# Patient Record
Sex: MALE | Race: WHITE | NOT HISPANIC OR LATINO | URBAN - METROPOLITAN AREA
[De-identification: names, ages, dates, MRNs, and addresses within clinical notes are randomized per-mention and may not be internally consistent; named-entity substitution may affect disease eponyms.]

---

## 2022-06-19 ENCOUNTER — EMERGENCY (EMERGENCY)
Facility: HOSPITAL | Age: 82
LOS: 1 days | Discharge: HOME | End: 2022-06-19
Attending: EMERGENCY MEDICINE | Admitting: EMERGENCY MEDICINE
Payer: MEDICARE

## 2022-06-19 VITALS
RESPIRATION RATE: 17 BRPM | WEIGHT: 145.06 LBS | OXYGEN SATURATION: 98 % | SYSTOLIC BLOOD PRESSURE: 143 MMHG | DIASTOLIC BLOOD PRESSURE: 72 MMHG | HEIGHT: 70 IN | HEART RATE: 93 BPM | TEMPERATURE: 100 F

## 2022-06-19 VITALS
TEMPERATURE: 98 F | HEART RATE: 80 BPM | OXYGEN SATURATION: 98 % | RESPIRATION RATE: 16 BRPM | DIASTOLIC BLOOD PRESSURE: 61 MMHG | SYSTOLIC BLOOD PRESSURE: 127 MMHG

## 2022-06-19 DIAGNOSIS — Z90.49 ACQUIRED ABSENCE OF OTHER SPECIFIED PARTS OF DIGESTIVE TRACT: Chronic | ICD-10-CM

## 2022-06-19 DIAGNOSIS — Z98.89 OTHER SPECIFIED POSTPROCEDURAL STATES: Chronic | ICD-10-CM

## 2022-06-19 LAB
ALBUMIN SERPL ELPH-MCNC: 3.2 G/DL — LOW (ref 3.3–5)
ALP SERPL-CCNC: 99 U/L — SIGNIFICANT CHANGE UP (ref 40–120)
ALT FLD-CCNC: 16 U/L — SIGNIFICANT CHANGE UP (ref 12–78)
ANION GAP SERPL CALC-SCNC: 5 MMOL/L — SIGNIFICANT CHANGE UP (ref 5–17)
AST SERPL-CCNC: 12 U/L — LOW (ref 15–37)
BASOPHILS # BLD AUTO: 0 K/UL — SIGNIFICANT CHANGE UP (ref 0–0.2)
BASOPHILS NFR BLD AUTO: 0 % — SIGNIFICANT CHANGE UP (ref 0–2)
BILIRUB SERPL-MCNC: 1.6 MG/DL — HIGH (ref 0.2–1.2)
BUN SERPL-MCNC: 28 MG/DL — HIGH (ref 7–23)
CALCIUM SERPL-MCNC: 9.7 MG/DL — SIGNIFICANT CHANGE UP (ref 8.5–10.1)
CHLORIDE SERPL-SCNC: 103 MMOL/L — SIGNIFICANT CHANGE UP (ref 96–108)
CO2 SERPL-SCNC: 27 MMOL/L — SIGNIFICANT CHANGE UP (ref 22–31)
CREAT SERPL-MCNC: 1.6 MG/DL — HIGH (ref 0.5–1.3)
EGFR: 43 ML/MIN/1.73M2 — LOW
EOSINOPHIL # BLD AUTO: 0.17 K/UL — SIGNIFICANT CHANGE UP (ref 0–0.5)
EOSINOPHIL NFR BLD AUTO: 1 % — SIGNIFICANT CHANGE UP (ref 0–6)
GLUCOSE SERPL-MCNC: 115 MG/DL — HIGH (ref 70–99)
HCT VFR BLD CALC: 46.8 % — SIGNIFICANT CHANGE UP (ref 39–50)
HGB BLD-MCNC: 16 G/DL — SIGNIFICANT CHANGE UP (ref 13–17)
LIDOCAIN IGE QN: 88 U/L — SIGNIFICANT CHANGE UP (ref 73–393)
LYMPHOCYTES # BLD AUTO: 0.17 K/UL — LOW (ref 1–3.3)
LYMPHOCYTES # BLD AUTO: 1 % — LOW (ref 13–44)
MCHC RBC-ENTMCNC: 30.1 PG — SIGNIFICANT CHANGE UP (ref 27–34)
MCHC RBC-ENTMCNC: 34.2 GM/DL — SIGNIFICANT CHANGE UP (ref 32–36)
MCV RBC AUTO: 88.1 FL — SIGNIFICANT CHANGE UP (ref 80–100)
MONOCYTES # BLD AUTO: 2.51 K/UL — HIGH (ref 0–0.9)
MONOCYTES NFR BLD AUTO: 15 % — HIGH (ref 2–14)
NEUTROPHILS # BLD AUTO: 13.72 K/UL — HIGH (ref 1.8–7.4)
NEUTROPHILS NFR BLD AUTO: 81 % — HIGH (ref 43–77)
NRBC # BLD: SIGNIFICANT CHANGE UP /100 WBCS (ref 0–0)
PLATELET # BLD AUTO: 203 K/UL — SIGNIFICANT CHANGE UP (ref 150–400)
POTASSIUM SERPL-MCNC: 4.6 MMOL/L — SIGNIFICANT CHANGE UP (ref 3.5–5.3)
POTASSIUM SERPL-SCNC: 4.6 MMOL/L — SIGNIFICANT CHANGE UP (ref 3.5–5.3)
PROT SERPL-MCNC: 7 G/DL — SIGNIFICANT CHANGE UP (ref 6–8.3)
RBC # BLD: 5.31 M/UL — SIGNIFICANT CHANGE UP (ref 4.2–5.8)
RBC # FLD: 12.3 % — SIGNIFICANT CHANGE UP (ref 10.3–14.5)
SODIUM SERPL-SCNC: 135 MMOL/L — SIGNIFICANT CHANGE UP (ref 135–145)
WBC # BLD: 16.73 K/UL — HIGH (ref 3.8–10.5)
WBC # FLD AUTO: 16.73 K/UL — HIGH (ref 3.8–10.5)

## 2022-06-19 PROCEDURE — 85025 COMPLETE CBC W/AUTO DIFF WBC: CPT

## 2022-06-19 PROCEDURE — 36415 COLL VENOUS BLD VENIPUNCTURE: CPT

## 2022-06-19 PROCEDURE — 83690 ASSAY OF LIPASE: CPT

## 2022-06-19 PROCEDURE — 74176 CT ABD & PELVIS W/O CONTRAST: CPT | Mod: 26,MA

## 2022-06-19 PROCEDURE — 99284 EMERGENCY DEPT VISIT MOD MDM: CPT | Mod: 25

## 2022-06-19 PROCEDURE — 74176 CT ABD & PELVIS W/O CONTRAST: CPT | Mod: MA

## 2022-06-19 PROCEDURE — 96374 THER/PROPH/DIAG INJ IV PUSH: CPT

## 2022-06-19 PROCEDURE — 80053 COMPREHEN METABOLIC PANEL: CPT

## 2022-06-19 PROCEDURE — 99284 EMERGENCY DEPT VISIT MOD MDM: CPT | Mod: FS

## 2022-06-19 RX ORDER — METRONIDAZOLE 500 MG
1 TABLET ORAL
Qty: 30 | Refills: 0
Start: 2022-06-19 | End: 2022-06-28

## 2022-06-19 RX ORDER — MULTIVIT WITH MIN/MFOLATE/K2 340-15/3 G
1 POWDER (GRAM) ORAL ONCE
Refills: 0 | Status: COMPLETED | OUTPATIENT
Start: 2022-06-19 | End: 2022-06-19

## 2022-06-19 RX ORDER — DIATRIZOATE MEGLUMINE 180 MG/ML
100 INJECTION, SOLUTION INTRAVESICAL ONCE
Refills: 0 | Status: COMPLETED | OUTPATIENT
Start: 2022-06-19 | End: 2022-06-19

## 2022-06-19 RX ORDER — SODIUM CHLORIDE 9 MG/ML
1000 INJECTION INTRAMUSCULAR; INTRAVENOUS; SUBCUTANEOUS ONCE
Refills: 0 | Status: COMPLETED | OUTPATIENT
Start: 2022-06-19 | End: 2022-06-19

## 2022-06-19 RX ORDER — PIPERACILLIN AND TAZOBACTAM 4; .5 G/20ML; G/20ML
3.38 INJECTION, POWDER, LYOPHILIZED, FOR SOLUTION INTRAVENOUS ONCE
Refills: 0 | Status: COMPLETED | OUTPATIENT
Start: 2022-06-19 | End: 2022-06-19

## 2022-06-19 RX ORDER — IOHEXOL 300 MG/ML
30 INJECTION, SOLUTION INTRAVENOUS ONCE
Refills: 0 | Status: DISCONTINUED | OUTPATIENT
Start: 2022-06-19 | End: 2022-06-19

## 2022-06-19 RX ADMIN — PIPERACILLIN AND TAZOBACTAM 200 GRAM(S): 4; .5 INJECTION, POWDER, LYOPHILIZED, FOR SOLUTION INTRAVENOUS at 21:53

## 2022-06-19 RX ADMIN — DIATRIZOATE MEGLUMINE 30 MILLILITER(S): 180 INJECTION, SOLUTION INTRAVESICAL at 17:52

## 2022-06-19 RX ADMIN — Medication 1 BOTTLE: at 21:53

## 2022-06-19 RX ADMIN — SODIUM CHLORIDE 1000 MILLILITER(S): 9 INJECTION INTRAMUSCULAR; INTRAVENOUS; SUBCUTANEOUS at 14:44

## 2022-06-19 NOTE — ED ADULT NURSE REASSESSMENT NOTE - NS ED NURSE REASSESS COMMENT FT1
1405: Pt voided 100 cc of lawrence urine, post bladder scan done urine: > 113. DARI mack. # 16 fr Knight Cath inserted with the proper technique.

## 2022-06-19 NOTE — ED PROVIDER NOTE - OBJECTIVE STATEMENT
82 M c/o constipation and lower abd pain x 6 days. Spoke to covering GI yesterday who recommended senna, took yesterday with no improvement. Started keflex 6 days ago due to mohs surgery on forehead. Due for colonoscopy at end of this month due to GI issues, diarrhea. Tolerating PO.    GI Ashley  PMD [place]

## 2022-06-19 NOTE — ED PROVIDER NOTE - PROGRESS NOTE DETAILS
Reevaluated patient at bedside.  Patient feeling well. Smog not given. Discussed the results of all diagnostic testing in ED and copies of all available reports given.   An opportunity to ask questions was provided.  Discussed the importance of prompt, close medical follow-up. Advised to call his GI doctor tomorrow to discuss results and follow up. Patient will return with any changes, concerns or persistent/worsening symptoms.  Understanding of all instructions verbalized.

## 2022-06-19 NOTE — ED PROVIDER NOTE - ATTENDING APP SHARED VISIT CONTRIBUTION OF CARE
pt seen and examined with wife at bedside he is 81 yo hx of bphn memory impairment, tremor pmd dr lynch gi dr herring sp appy and hernia surgery presents with abd pain and distention for past few days no bm for 6 days. 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

## 2022-06-19 NOTE — ED PROVIDER NOTE - NSFOLLOWUPINSTRUCTIONS_ED_ALL_ED_FT
Augmentin twice a day.  Flagyl three times a day.  Call your GI doctor tomorrow to discuss results.  Return to the Emergency Department for worsening or concerning symptoms.    Diverticulitis is when small pouches in your colon (large intestine) get infected or swollen. This causes pain in the belly (abdomen) and watery poop (diarrhea).    These pouches are called diverticula. The pouches form in people who have a condition called diverticulosis.      What are the causes?    This condition may be caused by poop (stool) that gets trapped in the pouches in your colon. The poop lets germs (bacteria) grow in the pouches. This causes the infection.      What increases the risk?    You are more likely to get this condition if you have small pouches in your colon. The risk is higher if:  •You are overweight or very overweight (obese).      •You do not exercise enough.      •You drink alcohol.      •You smoke or use products with tobacco in them.      •You eat a diet that has a lot of red meat such as beef, pork, or lamb.      •You eat a diet that does not have enough fiber in it.      •You are older than 40 years of age.        What are the signs or symptoms?    •Pain in the belly. Pain is often on the left side, but it may be in other areas.      •Fever and feeling cold.      •Feeling like you may vomit.      •Vomiting.      •Having cramps.      •Feeling full.      •Changes to how often you poop.      •Blood in your poop.        How is this treated?    Most cases are treated at home by:  •Taking over-the-counter pain medicines.      •Following a clear liquid diet.      •Taking antibiotic medicines.      •Resting.      Very bad cases may need to be treated at a hospital. This may include:  •Not eating or drinking.      •Taking prescription pain medicine.      •Getting antibiotic medicines through an IV tube.      •Getting fluid and food through an IV tube.      •Having surgery.      When you are feeling better, your doctor may tell you to have a test to check your colon (colonoscopy).      Follow these instructions at home:    Medicines   •Take over-the-counter and prescription medicines only as told by your doctor. These include:  •Antibiotics.      •Pain medicines.      •Fiber pills.      •Probiotics.      •Stool softeners.        •If you were prescribed an antibiotic medicine, take it as told by your doctor. Do not stop taking the antibiotic even if you start to feel better.      •Ask your doctor if the medicine prescribed to you requires you to avoid driving or using machinery.        Eating and drinking      •Follow a diet as told by your doctor.    •When you feel better, your doctor may tell you to change your diet. You may need to eat a lot of fiber. Fiber makes it easier to poop (have a bowel movement). Foods with fiber include:  •Berries.      •Beans.      •Lentils.      •Green vegetables.        •Avoid eating red meat.      General instructions     • Do not use any products that contain nicotine or tobacco, such as cigarettes, e-cigarettes, and chewing tobacco. If you need help quitting, ask your doctor.      •Exercise 3 or more times a week. Try to get 30 minutes each time. Exercise enough to sweat and make your heart beat faster.      •Keep all follow-up visits as told by your doctor. This is important.        Contact a doctor if:    •Your pain does not get better.      •You are not pooping like normal.        Get help right away if:    •Your pain gets worse.      •Your symptoms do not get better.      •Your symptoms get worse very fast.      •You have a fever.      •You vomit more than one time.    •You have poop that is:  •Bloody.      •Black.      •Tarry.          Summary    •This condition happens when small pouches in your colon get infected or swollen.      •Take medicines only as told by your doctor.      •Follow a diet as told by your doctor.      •Keep all follow-up visits as told by your doctor. This is important.      This information is not intended to replace advice given to you by your health care provider. Make sure you discuss any questions you have with your health care provider.

## 2022-06-19 NOTE — ED PROVIDER NOTE - CLINICAL SUMMARY MEDICAL DECISION MAKING FREE TEXT BOX
pt seen and examined with wife at bedside he is 83 yo hx of bphn memory impairment, tremor pmd dr lynch gi dr herring sp appy and hernia surgery presents with abd pain and distention for past few days no bm for 6 days. 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

## 2022-06-19 NOTE — ED ADULT TRIAGE NOTE - CHIEF COMPLAINT QUOTE
"I havent had a bowel movement in 6 days and I have abd pains" "I haven't had a bowel movement in 6 days and I have abd pains"

## 2022-06-19 NOTE — ED PROVIDER NOTE - CARE PROVIDER_API CALL
All Ramirez)  Gastroenterology; Internal Medicine  [address], [address]  [place] [address]  Phone: [phone]  Fax: [phone]  Follow Up Time:

## 2022-06-19 NOTE — ED PROVIDER NOTE - PATIENT PORTAL LINK FT
You can access the FollowMyHealth Patient Portal offered by [facility] by registering at the following website: [email or web address]. By joining [facility]’s FollowMyHealth portal, you will also be able to view your health information using other applications (apps) compatible with our system.

## 2022-06-19 NOTE — ED ADULT NURSE NOTE - NSICDXPASTMEDICALHX_GEN_ALL_CORE_FT
PAST MEDICAL HISTORY:  Benign non-nodular prostatic hyperplasia, presence of lower urinary tract symptoms unspecified     Hypothyroid     Memory impairment

## 2022-06-19 NOTE — ED PROVIDER NOTE - NS ED ATTENDING STATEMENT MOD
This was a shared visit with the NADINE. I reviewed and verified the documentation and independently performed the documented:

## 2022-11-22 ENCOUNTER — TRANSCRIPTION ENCOUNTER (OUTPATIENT)
Age: 82
End: 2022-11-22

## 2022-11-23 ENCOUNTER — INPATIENT (INPATIENT)
Facility: HOSPITAL | Age: 82
LOS: 7 days | Discharge: HOME | DRG: 329 | End: 2022-12-01
Attending: SPECIALIST | Admitting: SPECIALIST
Payer: MEDICARE

## 2022-11-23 ENCOUNTER — TRANSCRIPTION ENCOUNTER (OUTPATIENT)
Age: 82
End: 2022-11-23

## 2022-11-23 ENCOUNTER — RESULT REVIEW (OUTPATIENT)
Age: 82
End: 2022-11-23

## 2022-11-23 VITALS
OXYGEN SATURATION: 98 % | HEART RATE: 84 BPM | RESPIRATION RATE: 18 BRPM | SYSTOLIC BLOOD PRESSURE: 153 MMHG | DIASTOLIC BLOOD PRESSURE: 80 MMHG | TEMPERATURE: 97 F | WEIGHT: 147.93 LBS

## 2022-11-23 DIAGNOSIS — Z98.890 OTHER SPECIFIED POSTPROCEDURAL STATES: Chronic | ICD-10-CM

## 2022-11-23 DIAGNOSIS — E03.9 HYPOTHYROIDISM, UNSPECIFIED: ICD-10-CM

## 2022-11-23 DIAGNOSIS — I49.9 CARDIAC ARRHYTHMIA, UNSPECIFIED: ICD-10-CM

## 2022-11-23 DIAGNOSIS — Z98.89 OTHER SPECIFIED POSTPROCEDURAL STATES: Chronic | ICD-10-CM

## 2022-11-23 DIAGNOSIS — Z87.438 PERSONAL HISTORY OF OTHER DISEASES OF MALE GENITAL ORGANS: ICD-10-CM

## 2022-11-23 DIAGNOSIS — K63.1 PERFORATION OF INTESTINE (NONTRAUMATIC): ICD-10-CM

## 2022-11-23 DIAGNOSIS — Z90.49 ACQUIRED ABSENCE OF OTHER SPECIFIED PARTS OF DIGESTIVE TRACT: Chronic | ICD-10-CM

## 2022-11-23 PROBLEM — R41.3 OTHER AMNESIA: Chronic | Status: ACTIVE | Noted: 2022-06-19

## 2022-11-23 LAB
ALBUMIN SERPL ELPH-MCNC: 3.3 G/DL — SIGNIFICANT CHANGE UP (ref 3.3–5)
ALP SERPL-CCNC: 94 U/L — SIGNIFICANT CHANGE UP (ref 40–120)
ALT FLD-CCNC: 20 U/L — SIGNIFICANT CHANGE UP (ref 12–78)
ANION GAP SERPL CALC-SCNC: 6 MMOL/L — SIGNIFICANT CHANGE UP (ref 5–17)
APTT BLD: 31.1 SEC — SIGNIFICANT CHANGE UP (ref 27.5–35.5)
AST SERPL-CCNC: 14 U/L — LOW (ref 15–37)
BASOPHILS # BLD AUTO: 0.02 K/UL — SIGNIFICANT CHANGE UP (ref 0–0.2)
BASOPHILS NFR BLD AUTO: 0.1 % — SIGNIFICANT CHANGE UP (ref 0–2)
BILIRUB SERPL-MCNC: 2 MG/DL — HIGH (ref 0.2–1.2)
BUN SERPL-MCNC: 22 MG/DL — SIGNIFICANT CHANGE UP (ref 7–23)
CALCIUM SERPL-MCNC: 9.5 MG/DL — SIGNIFICANT CHANGE UP (ref 8.5–10.1)
CHLORIDE SERPL-SCNC: 105 MMOL/L — SIGNIFICANT CHANGE UP (ref 96–108)
CO2 SERPL-SCNC: 27 MMOL/L — SIGNIFICANT CHANGE UP (ref 22–31)
CREAT SERPL-MCNC: 1.4 MG/DL — HIGH (ref 0.5–1.3)
EGFR: 50 ML/MIN/1.73M2 — LOW
EOSINOPHIL # BLD AUTO: 0.03 K/UL — SIGNIFICANT CHANGE UP (ref 0–0.5)
EOSINOPHIL NFR BLD AUTO: 0.2 % — SIGNIFICANT CHANGE UP (ref 0–6)
GLUCOSE SERPL-MCNC: 114 MG/DL — HIGH (ref 70–99)
HCT VFR BLD CALC: 45.2 % — SIGNIFICANT CHANGE UP (ref 39–50)
HGB BLD-MCNC: 15.2 G/DL — SIGNIFICANT CHANGE UP (ref 13–17)
IMM GRANULOCYTES NFR BLD AUTO: 0.3 % — SIGNIFICANT CHANGE UP (ref 0–0.9)
INR BLD: 1.41 RATIO — HIGH (ref 0.88–1.16)
LACTATE SERPL-SCNC: 0.6 MMOL/L — LOW (ref 0.7–2)
LIDOCAIN IGE QN: 85 U/L — SIGNIFICANT CHANGE UP (ref 73–393)
LYMPHOCYTES # BLD AUTO: 0.64 K/UL — LOW (ref 1–3.3)
LYMPHOCYTES # BLD AUTO: 4.4 % — LOW (ref 13–44)
MAGNESIUM SERPL-MCNC: 2 MG/DL — SIGNIFICANT CHANGE UP (ref 1.6–2.6)
MCHC RBC-ENTMCNC: 30 PG — SIGNIFICANT CHANGE UP (ref 27–34)
MCHC RBC-ENTMCNC: 33.6 GM/DL — SIGNIFICANT CHANGE UP (ref 32–36)
MCV RBC AUTO: 89.3 FL — SIGNIFICANT CHANGE UP (ref 80–100)
MONOCYTES # BLD AUTO: 1 K/UL — HIGH (ref 0–0.9)
MONOCYTES NFR BLD AUTO: 6.8 % — SIGNIFICANT CHANGE UP (ref 2–14)
NEUTROPHILS # BLD AUTO: 12.88 K/UL — HIGH (ref 1.8–7.4)
NEUTROPHILS NFR BLD AUTO: 88.2 % — HIGH (ref 43–77)
NRBC # BLD: 0 /100 WBCS — SIGNIFICANT CHANGE UP (ref 0–0)
PLATELET # BLD AUTO: 175 K/UL — SIGNIFICANT CHANGE UP (ref 150–400)
POTASSIUM SERPL-MCNC: 4.3 MMOL/L — SIGNIFICANT CHANGE UP (ref 3.5–5.3)
POTASSIUM SERPL-SCNC: 4.3 MMOL/L — SIGNIFICANT CHANGE UP (ref 3.5–5.3)
PROT SERPL-MCNC: 7 G/DL — SIGNIFICANT CHANGE UP (ref 6–8.3)
PROTHROM AB SERPL-ACNC: 16.6 SEC — HIGH (ref 10.5–13.4)
RBC # BLD: 5.06 M/UL — SIGNIFICANT CHANGE UP (ref 4.2–5.8)
RBC # FLD: 12.9 % — SIGNIFICANT CHANGE UP (ref 10.3–14.5)
SARS-COV-2 RNA SPEC QL NAA+PROBE: SIGNIFICANT CHANGE UP
SODIUM SERPL-SCNC: 138 MMOL/L — SIGNIFICANT CHANGE UP (ref 135–145)
TROPONIN I, HIGH SENSITIVITY RESULT: 7.1 NG/L — SIGNIFICANT CHANGE UP
TSH SERPL-MCNC: 3.21 UIU/ML — SIGNIFICANT CHANGE UP (ref 0.36–3.74)
WBC # BLD: 14.61 K/UL — HIGH (ref 3.8–10.5)
WBC # FLD AUTO: 14.61 K/UL — HIGH (ref 3.8–10.5)

## 2022-11-23 PROCEDURE — G1004: CPT

## 2022-11-23 PROCEDURE — 93010 ELECTROCARDIOGRAM REPORT: CPT

## 2022-11-23 PROCEDURE — 99222 1ST HOSP IP/OBS MODERATE 55: CPT

## 2022-11-23 PROCEDURE — 74177 CT ABD & PELVIS W/CONTRAST: CPT | Mod: 26,ME

## 2022-11-23 PROCEDURE — 44202 LAP ENTERECTOMY: CPT

## 2022-11-23 PROCEDURE — 99285 EMERGENCY DEPT VISIT HI MDM: CPT | Mod: FS

## 2022-11-23 PROCEDURE — 44202 LAP ENTERECTOMY: CPT | Mod: 80

## 2022-11-23 PROCEDURE — 88307 TISSUE EXAM BY PATHOLOGIST: CPT | Mod: 26

## 2022-11-23 PROCEDURE — 99223 1ST HOSP IP/OBS HIGH 75: CPT

## 2022-11-23 DEVICE — STAPLER COVIDIEN TA 90 BLUE: Type: IMPLANTABLE DEVICE | Status: FUNCTIONAL

## 2022-11-23 DEVICE — STAPLER COVIDIEN TRI-STAPLE 60MM TAN RELOAD: Type: IMPLANTABLE DEVICE | Status: FUNCTIONAL

## 2022-11-23 RX ORDER — SODIUM CHLORIDE 9 MG/ML
1000 INJECTION INTRAMUSCULAR; INTRAVENOUS; SUBCUTANEOUS ONCE
Refills: 0 | Status: COMPLETED | OUTPATIENT
Start: 2022-11-23 | End: 2022-11-23

## 2022-11-23 RX ORDER — PIPERACILLIN AND TAZOBACTAM 4; .5 G/20ML; G/20ML
3.38 INJECTION, POWDER, LYOPHILIZED, FOR SOLUTION INTRAVENOUS ONCE
Refills: 0 | Status: COMPLETED | OUTPATIENT
Start: 2022-11-23 | End: 2022-11-23

## 2022-11-23 RX ORDER — MORPHINE SULFATE 50 MG/1
4 CAPSULE, EXTENDED RELEASE ORAL ONCE
Refills: 0 | Status: DISCONTINUED | OUTPATIENT
Start: 2022-11-23 | End: 2022-11-23

## 2022-11-23 RX ORDER — SODIUM CHLORIDE 9 MG/ML
1000 INJECTION, SOLUTION INTRAVENOUS
Refills: 0 | Status: DISCONTINUED | OUTPATIENT
Start: 2022-11-23 | End: 2022-11-26

## 2022-11-23 RX ORDER — ACETAMINOPHEN 500 MG
1000 TABLET ORAL ONCE
Refills: 0 | Status: COMPLETED | OUTPATIENT
Start: 2022-11-24 | End: 2022-11-24

## 2022-11-23 RX ORDER — SODIUM CHLORIDE 9 MG/ML
1000 INJECTION INTRAMUSCULAR; INTRAVENOUS; SUBCUTANEOUS
Refills: 0 | Status: DISCONTINUED | OUTPATIENT
Start: 2022-11-23 | End: 2022-11-23

## 2022-11-23 RX ORDER — HYDROMORPHONE HYDROCHLORIDE 2 MG/ML
0.25 INJECTION INTRAMUSCULAR; INTRAVENOUS; SUBCUTANEOUS EVERY 4 HOURS
Refills: 0 | Status: DISCONTINUED | OUTPATIENT
Start: 2022-11-23 | End: 2022-11-27

## 2022-11-23 RX ORDER — TAMSULOSIN HYDROCHLORIDE 0.4 MG/1
0.4 CAPSULE ORAL AT BEDTIME
Refills: 0 | Status: DISCONTINUED | OUTPATIENT
Start: 2022-11-23 | End: 2022-12-01

## 2022-11-23 RX ORDER — ENOXAPARIN SODIUM 100 MG/ML
40 INJECTION SUBCUTANEOUS EVERY 24 HOURS
Refills: 0 | Status: DISCONTINUED | OUTPATIENT
Start: 2022-11-24 | End: 2022-12-01

## 2022-11-23 RX ORDER — CYCLOSPORINE 100 MG/1
100 CAPSULE ORAL DAILY
Refills: 0 | Status: DISCONTINUED | OUTPATIENT
Start: 2022-11-23 | End: 2022-11-24

## 2022-11-23 RX ORDER — ONDANSETRON 8 MG/1
4 TABLET, FILM COATED ORAL ONCE
Refills: 0 | Status: COMPLETED | OUTPATIENT
Start: 2022-11-23 | End: 2022-11-23

## 2022-11-23 RX ORDER — HYDROMORPHONE HYDROCHLORIDE 2 MG/ML
0.5 INJECTION INTRAMUSCULAR; INTRAVENOUS; SUBCUTANEOUS
Refills: 0 | Status: DISCONTINUED | OUTPATIENT
Start: 2022-11-23 | End: 2022-11-24

## 2022-11-23 RX ORDER — ACETAMINOPHEN 500 MG
1000 TABLET ORAL ONCE
Refills: 0 | Status: COMPLETED | OUTPATIENT
Start: 2022-11-23 | End: 2022-11-23

## 2022-11-23 RX ORDER — KETOROLAC TROMETHAMINE 30 MG/ML
15 SYRINGE (ML) INJECTION EVERY 6 HOURS
Refills: 0 | Status: DISCONTINUED | OUTPATIENT
Start: 2022-11-23 | End: 2022-11-23

## 2022-11-23 RX ORDER — PANTOPRAZOLE SODIUM 20 MG/1
40 TABLET, DELAYED RELEASE ORAL DAILY
Refills: 0 | Status: DISCONTINUED | OUTPATIENT
Start: 2022-11-23 | End: 2022-11-28

## 2022-11-23 RX ORDER — LEVOTHYROXINE SODIUM 125 MCG
50 TABLET ORAL AT BEDTIME
Refills: 0 | Status: DISCONTINUED | OUTPATIENT
Start: 2022-11-23 | End: 2022-11-30

## 2022-11-23 RX ORDER — ACETAMINOPHEN 500 MG
1000 TABLET ORAL ONCE
Refills: 0 | Status: COMPLETED | OUTPATIENT
Start: 2022-11-25 | End: 2022-11-25

## 2022-11-23 RX ORDER — PIPERACILLIN AND TAZOBACTAM 4; .5 G/20ML; G/20ML
3.38 INJECTION, POWDER, LYOPHILIZED, FOR SOLUTION INTRAVENOUS EVERY 8 HOURS
Refills: 0 | Status: COMPLETED | OUTPATIENT
Start: 2022-11-23 | End: 2022-11-30

## 2022-11-23 RX ORDER — METOPROLOL TARTRATE 50 MG
1.25 TABLET ORAL EVERY 6 HOURS
Refills: 0 | Status: DISCONTINUED | OUTPATIENT
Start: 2022-11-23 | End: 2022-11-26

## 2022-11-23 RX ORDER — SODIUM CHLORIDE 9 MG/ML
1000 INJECTION, SOLUTION INTRAVENOUS
Refills: 0 | Status: DISCONTINUED | OUTPATIENT
Start: 2022-11-23 | End: 2022-11-24

## 2022-11-23 RX ORDER — HYDROMORPHONE HYDROCHLORIDE 2 MG/ML
0.5 INJECTION INTRAMUSCULAR; INTRAVENOUS; SUBCUTANEOUS EVERY 4 HOURS
Refills: 0 | Status: DISCONTINUED | OUTPATIENT
Start: 2022-11-23 | End: 2022-11-27

## 2022-11-23 RX ORDER — DONEPEZIL HYDROCHLORIDE 10 MG/1
5 TABLET, FILM COATED ORAL AT BEDTIME
Refills: 0 | Status: DISCONTINUED | OUTPATIENT
Start: 2022-11-23 | End: 2022-12-01

## 2022-11-23 RX ORDER — ONDANSETRON 8 MG/1
4 TABLET, FILM COATED ORAL EVERY 6 HOURS
Refills: 0 | Status: DISCONTINUED | OUTPATIENT
Start: 2022-11-23 | End: 2022-12-01

## 2022-11-23 RX ORDER — ONDANSETRON 8 MG/1
4 TABLET, FILM COATED ORAL ONCE
Refills: 0 | Status: DISCONTINUED | OUTPATIENT
Start: 2022-11-23 | End: 2022-11-24

## 2022-11-23 RX ADMIN — PIPERACILLIN AND TAZOBACTAM 25 GRAM(S): 4; .5 INJECTION, POWDER, LYOPHILIZED, FOR SOLUTION INTRAVENOUS at 23:10

## 2022-11-23 RX ADMIN — SODIUM CHLORIDE 1000 MILLILITER(S): 9 INJECTION INTRAMUSCULAR; INTRAVENOUS; SUBCUTANEOUS at 13:04

## 2022-11-23 RX ADMIN — HYDROMORPHONE HYDROCHLORIDE 0.5 MILLIGRAM(S): 2 INJECTION INTRAMUSCULAR; INTRAVENOUS; SUBCUTANEOUS at 22:38

## 2022-11-23 RX ADMIN — Medication 400 MILLIGRAM(S): at 22:50

## 2022-11-23 RX ADMIN — HYDROMORPHONE HYDROCHLORIDE 0.5 MILLIGRAM(S): 2 INJECTION INTRAMUSCULAR; INTRAVENOUS; SUBCUTANEOUS at 22:16

## 2022-11-23 RX ADMIN — MORPHINE SULFATE 4 MILLIGRAM(S): 50 CAPSULE, EXTENDED RELEASE ORAL at 12:34

## 2022-11-23 RX ADMIN — SODIUM CHLORIDE 1000 MILLILITER(S): 9 INJECTION INTRAMUSCULAR; INTRAVENOUS; SUBCUTANEOUS at 12:04

## 2022-11-23 RX ADMIN — PIPERACILLIN AND TAZOBACTAM 200 GRAM(S): 4; .5 INJECTION, POWDER, LYOPHILIZED, FOR SOLUTION INTRAVENOUS at 16:04

## 2022-11-23 RX ADMIN — SODIUM CHLORIDE 100 MILLILITER(S): 9 INJECTION INTRAMUSCULAR; INTRAVENOUS; SUBCUTANEOUS at 18:24

## 2022-11-23 RX ADMIN — PIPERACILLIN AND TAZOBACTAM 3.38 GRAM(S): 4; .5 INJECTION, POWDER, LYOPHILIZED, FOR SOLUTION INTRAVENOUS at 16:34

## 2022-11-23 RX ADMIN — ONDANSETRON 4 MILLIGRAM(S): 8 TABLET, FILM COATED ORAL at 12:04

## 2022-11-23 RX ADMIN — MORPHINE SULFATE 4 MILLIGRAM(S): 50 CAPSULE, EXTENDED RELEASE ORAL at 12:04

## 2022-11-23 RX ADMIN — SODIUM CHLORIDE 100 MILLILITER(S): 9 INJECTION, SOLUTION INTRAVENOUS at 22:10

## 2022-11-23 NOTE — ED ADULT NURSE NOTE - OBJECTIVE STATEMENT
Pt sent from urgent care due to abd distention/pain, no BM for the past few days. Seen for similar June 2022, diagnosed with diverticulitis. Also states has been having intermittent palpitations x days vs weeks. Has loop recorder but states it is "not hooked up". Denies f/c, cp, sob, n/v, dysuria, wife at bedside. Safety maintained, call bell within reach. Nursing care ongoing.

## 2022-11-23 NOTE — ED PROVIDER NOTE - CLINICAL SUMMARY MEDICAL DECISION MAKING FREE TEXT BOX
Patient is an 82-year-old male who presents the emergency room with a chief complaint of abdominal pain.  Past medical history of BPH, hypothyroidism, memory impairment, diverticulitis.  Patient was seen this past June with abdominal pain and constipation.  He was diagnosed with acute uncomplicated diverticulitis at the time and was discharged home.  He was post to follow-up with GI for colonoscopy but did not.  He reports that those symptoms have resolved and he was doing well up until approximately 3 days ago when he developed diffuse lower abdominal pain and bloating.  He denies any fevers chills nausea vomiting chest pain shortness of breath.  He reports that he has been constipated for the last several days.  He feels similar to the time he was diagnosed with diverticulitis.  He also reports that he has had some intermittent palpitations for the last few weeks he has a loop recorder but is not "hooked up".  He was initially seen at urgent care and was sent to the emergency room for further work-up.  On exam patient is laying in bed no acute distress normocephalic atraumatic pupils are equal round and reactive heart is regular rate lungs are clear to auscultation abdomen is soft but distended with diffuse tenderness to palpation to the lower abdominal region, hypoactive bowel sounds noted.  Patient presenting to the emergency room with a chief complaint of abdominal pain constipation.  Prior history of diverticulitis and high suspicion for recurrent episode.  Will obtain screening labs CT abdomen pelvis.  Patient is also reporting some intermittent palpitations well to do thyroid level EKG and will monitor.  This time patient appears to be stable and can likely be discharged home but his ultimate clinical course will be pending results.

## 2022-11-23 NOTE — PRE-OP CHECKLIST - NS PREOP CHK MONITOR ANESTHESIA CONSENT
done

## 2022-11-23 NOTE — H&P ADULT - NSHPPHYSICALEXAM_GEN_ALL_CORE
T(C): 36.3 (11-23-22 @ 11:01), Max: 36.3 (11-23-22 @ 11:01)  HR: 84 (11-23-22 @ 11:01) (84 - 84)  BP: 153/80 (11-23-22 @ 11:01) (153/80 - 153/80)  RR: 18 (11-23-22 @ 11:01) (18 - 18)  SpO2: 98% (11-23-22 @ 11:01) (98% - 98%)    CONSTITUTIONAL: Well groomed, no apparent distress  EYES: EOMI, No conjunctival or scleral injection, non-icteric  ENMT: Oral mucosa with moist membranes. Normal dentition  NECK: Supple, symmetric and without tracheal deviation   RESP: No respiratory distress, no use of accessory muscles  CV: b/l radial pulses strong  GI: distended, tender in all quadrants with increased tenderness to RLQ, voluntary guarding, no rebound; no hernia palpated. Appendectomy scar in RLQ open. Open R inguinal hernia scar.  PSYCH: Appropriate insight/judgment; mood and affect appropriate, recent/remote memory intact  Neuro: grossly intact

## 2022-11-23 NOTE — H&P ADULT - NSHPLABSRESULTS_GEN_ALL_CORE
Complete Blood Count + Automated Diff (11.23.22 @ 12:00)   WBC Count: 14.61 K/uL   RBC Count: 5.06 M/uL   Hemoglobin: 15.2 g/dL   Hematocrit: 45.2 %   Mean Cell Volume: 89.3 fl   Mean Cell Hemoglobin: 30.0 pg   Mean Cell Hemoglobin Conc: 33.6 gm/dL Troponin I, High Sensitivity Result: 7.1: Comprehensive Metabolic Panel (11.23.22 @ 12:00)   Sodium, Serum: 138 mmol/L   Potassium, Serum: 4.3 mmol/L   Chloride, Serum: 105 mmol/L   Carbon Dioxide, Serum: 27 mmol/L   Anion Gap, Serum: 6 mmol/L   Blood Urea Nitrogen, Serum: 22 mg/dL   Creatinine, Serum: 1.40 mg/dL   Glucose, Serum: 114 mg/dL   Calcium, Total Serum: 9.5 mg/dL   Protein Total, Serum: 7.0 g/dL   Albumin, Serum: 3.3 g/dL   Bilirubin Total, Serum: 2.0 mg/dL   Alkaline Phosphatase, Serum: 94 U/L   Aspartate Aminotransferase (AST/SGOT): 14 U/L   Alanine Aminotransferase (ALT/SGPT): 20 U/L   eGFR: 50: The estimated glomerular filtration rate (eGFR) is calculated using the < from: CT Abdomen and Pelvis w/ IV Cont (11.23.22 @ 14:32) >    CT of the Abdomen and Pelvis was performed.  Sagittal and coronal reformats were performed.    FINDINGS:    LOWER CHEST: No visualized pleural effusion. Dependent lung parenchymal   scarring. Coronary artery calcifications.    LIVER: Liver size within normal limits.  BILE DUCTS: Nobiliary distention  GALLBLADDER: Unremarkable  SPLEEN: Spleen size within normal limits  PANCREAS: No acute peripancreatic inflammation  ADRENALS: Unremarkable  KIDNEYS/URETERS: No hydronephrosis. Few subcentimeter hypodensities of   the kidneys are too small to characterize. Prominent left-sided   extrarenal pelvis are demonstrated. 2 mm nonobstructing right renal   calculus.    BLADDER: Mildly distended.  REPRODUCTIVE ORGANS: Enlarged prostate.    BOWEL: Stomach is moderately distended. Proximal to mid small bowel is   nondistended. Distal small bowel loops in the right lower quadrant   demonstrates short segment severe focal wall thickening and surrounding   extraluminal air-fluid measuring 2 cm along the mesentery. Additional   tiny droplets of extraluminal air noted. Findings are concerning for   focal small bowel enteritis with perforation. Few small bowel diverticula   are noted, as seen on prior imaging. Postappendectomy. Mild stool burden   of the colon limits evaluation of the colonic mucosa. Slight thickening   of the right colon is likely reactive in nature.  PERITONEUM: Right-sided mesenteric edema. 2 cm focal air/fluid of the   right hemiabdomen image 77 series 2. Trace additional ascites.  VESSELS: Aortoiliac atheromatous changes. Nonspecific slight soft tissue   prominence at the celiac trunk origin, of unclear significance. No   abdominal aortic aneurysm.  RETROPERITONEUM/LYMPH NODES: Small volume nodes.  ABDOMINAL WALL: Left inguinal postoperative changes.  BONES: Degenerative changes of the bones. Central canal and neural   foramina are not adequately assessed on this study.    IMPRESSION:    Severely inflamed short segment small bowel with perforation. 2cm   air/fluid pocket adjacent to the inflamed small bowel loop. Etiology is   unclear. Follow to resolution. Few small bowel diverticula are noted, as   seen on prior imaging.    Dr. Aparicio discussed these findings with DARI Thao on 11/23/2022 at   3:46PM, with read back.    Coronary artery calcifications.    < end of copied text >

## 2022-11-23 NOTE — CONSULT NOTE ADULT - ASSESSMENT
Assessment/Plan:  This is an 83 y/o M with BPH, hypothyroidism with recent ILR for possible arrhythmia presented from an Urgent Care Ctr for abdominal distention, diffuse bloating, and pain.  States, has not had BM for few days.   Had same symptoms in June of this year and diagnosed with diverticulitis.  Also c/p palpitation x weeks.  Denies CP, lightheadedness, SOB, GRIMALDO or orthopnea.  Denies N/V, fever, chills.    Cardiac Optimization/Cardiac Arrhythmia  - s/p recent (2 weeks ago) ILR for palpitations.  EKG showed sinus arrhythmias with no ischemia  - Patient is here for SBO with perforation  - Patient is now planned for surgery.  Patient has no known Hx of CAD, no obvious cardiac ischemia, volume overload, cardiac murmur, significant arrhythmia.  He is considered optimized for non-cardiac emergent surgery.  Routine hemodynamic monitoring recommended    - BP slightly elevated in the setting of pain  - Not on home BP med and no Hx of HTN.  Would not start treating now    Monitor electrolytes, replete to keep K>4 and Mag>2  Will continue to follow  Thank you for this consult    Vangie Chan DNP, NP-C  Cardiology  Spectra #[phone]         Assessment/Plan:  This is an 81 y/o M with BPH, hypothyroidism with recent ILR for possible arrhythmia presented from an Urgent Care Ctr for abdominal distention, diffuse bloating, and pain.  He was found to have perforated bowel and is planned to have urgent surgery    Cardiac Optimization/Cardiac Arrhythmia  - s/p recent (2 weeks ago) ILR for palpitations.  EKG showed sinus arrhythmias with no ischemia  - Patient is here for SBO with perforation  - Patient is now planned for surgery.  Patient has no known Hx of CAD, no obvious cardiac ischemia, volume overload, cardiac murmur, significant arrhythmia.  He is considered optimized for non-cardiac emergent surgery.  Routine hemodynamic monitoring recommended    - BP slightly elevated in the setting of pain  - Not on home BP med and no Hx of HTN.  Would not start treating now  - monitor closely on tele    Monitor electrolytes, replete to keep K>4 and Mag>2  Will continue to follow  Thank you for this consult    Vangie Chan DNP, NP-C  Cardiology  Spectra #[phone]

## 2022-11-23 NOTE — CONSULT NOTE ADULT - SUBJECTIVE AND OBJECTIVE BOX
[facility] - Anna Brice, Richa Ryder, Kevin, Familia, Vandana Murray  Office Number: [phone]    Initial Consult Note    CHIEF COMPLAINT: Patient is a 82y old  Male who presents with a chief complaint of     HPI: This is an 83 y/o M with BPH, hypothyroidism with recent ILR for possible arrhythmia presented from an Urgent [facility] for abdominal distention, diffuse bloating, and pain.  States, has not had BM for few days.   Had same symptoms in June of this year and diagnosed with diverticulitis.  Also c/p palpitation x weeks.  Denies CP, lightheadedness, SOB, GRIMALDO or orthopnea.  Denies N/V, fever, chills.    PAST MEDICAL & SURGICAL HISTORY:  Benign non-nodular prostatic hyperplasia, presence of lower urinary tract symptoms unspecified  Memory impairment  Hypothyroid  H/O right inguinal hernia repair  1995  S/P appendectomy    SOCIAL HISTORY:  No tobacco, ethanol, or drug abuse.  FAMILY HISTORY:  Family history of myocardial infarction (Father)    No family history of acute MI or sudden cardiac death.  MEDICATIONS  (STANDING):    MEDICATIONS  (PRN):    Allergies    bacitracin (Rash)    Intolerances    REVIEW OF SYSTEMS:  CONSTITUTIONAL: No weakness, fevers or chills  EYES/ENT: No visual changes;  No vertigo or throat pain   NECK: No pain or stiffness  RESPIRATORY: No cough, wheezing, hemoptysis; No shortness of breath  CARDIOVASCULAR: No chest pain or palpitations  GASTROINTESTINAL: No abdominal pain. No nausea, vomiting, or hematemesis; No diarrhea or constipation. No melena or hematochezia.  GENITOURINARY: No dysuria, frequency or hematuria  NEUROLOGICAL: No numbness or weakness  SKIN: No itching or rash  All other review of systems is negative unless indicated above  VITAL SIGNS:   Vital Signs Last 24 Hrs  T(C): 36.3 (23 Nov 2022 11:01), Max: 36.3 (23 Nov 2022 11:01)  T(F): 97.3 (23 Nov 2022 11:01), Max: 97.3 (23 Nov 2022 11:01)  HR: 84 (23 Nov 2022 11:01) (84 - 84)  BP: 153/80 (23 Nov 2022 11:01) (153/80 - 153/80)  BP(mean): --  RR: 18 (23 Nov 2022 11:01) (18 - 18)  SpO2: 98% (23 Nov 2022 11:01) (98% - 98%)    Parameters below as of 23 Nov 2022 11:01  Patient On (Oxygen Delivery Method): room air      I&O's Summary    On Exam:  Constitutional: NAD, alert and oriented x 3  Lungs:  Non-labored, breath sounds are clear bilaterally, No wheezing, rales or rhonchi  Cardiovascular: RRR.  S1 and S2 positive.  No murmurs, rubs, gallops or clicks  Gastrointestinal: Bowel Sounds present, +tender.   Lymph: No peripheral edema. No cervical lymphadenopathy.  Neurological: Alert, no focal deficits  Skin: No rashes or ulcers   Psych:  Mood & affect appropriate.    LABS: All Labs Reviewed:                        15.2   14.61 )-----------( 175      ( 23 Nov 2022 12:00 )             45.2     23 Nov 2022 12:00    138    |  105    |  22     ----------------------------<  114    4.3     |  27     |  1.40     Ca    9.5        23 Nov 2022 12:00  Mg     2.0       23 Nov 2022 12:00    TPro  7.0    /  Alb  3.3    /  TBili  2.0    /  DBili  x      /  AST  14     /  ALT  20     /  AlkPhos  94     23 Nov 2022 12:00    11-23 @ 12:00  TSH: 3.21    RADIOLOGY:    ACC: [id number] EXAM:  CT ABDOMEN AND PELVIS IC                          PROCEDURE DATE:  11/23/2022          INTERPRETATION:  CLINICAL INFORMATION: Abdominal pain, acute,   nonlocalized. Distention for 3-4 days. History of diverticulitis.    COMPARISON: CT abdomen pelvis 6/19/2022    CONTRAST/COMPLICATIONS:  IV Contrast: Omnipaque 350  90 cc administered   10 cc discarded  Oral Contrast: NONE  Complications: None reported at time of study completion    PROCEDURE:  CT of the Abdomen and Pelvis was performed.  Sagittal and coronal reformats were performed.    FINDINGS:    LOWER CHEST: No visualized pleural effusion. Dependent lung parenchymal   scarring. Coronary artery calcifications.    LIVER: Liver size within normal limits.  BILE DUCTS: Nobiliary distention  GALLBLADDER: Unremarkable  SPLEEN: Spleen size within normal limits  PANCREAS: No acute peripancreatic inflammation  ADRENALS: Unremarkable  KIDNEYS/URETERS: No hydronephrosis. Few subcentimeter hypodensities of   the kidneys are too small to characterize. Prominent left-sided   extrarenal pelvis are demonstrated. 2 mm nonobstructing right renal   calculus.    BLADDER: Mildly distended.  REPRODUCTIVE ORGANS: Enlarged prostate.    BOWEL: Stomach is moderately distended. Proximal to mid small bowel is   nondistended. Distal small bowel loops in the right lower quadrant   demonstrates short segment severe focal wall thickening and surrounding   extraluminal air-fluid measuring 2 cm along the mesentery. Additional   tiny droplets of extraluminal air noted. Findings are concerning for   focal small bowel enteritis with perforation. Few small bowel diverticula   are noted, as seen on prior imaging. Postappendectomy. Mild stool burden   of the colon limits evaluation of the colonic mucosa. Slight thickening   of the right colon is likely reactive in nature.  PERITONEUM: Right-sided mesenteric edema. 2 cm focal air/fluid of the   right hemiabdomen image 77 series 2. Trace additional ascites.  VESSELS: Aortoiliac atheromatous changes. Nonspecific slight soft tissue   prominence at the celiac trunk origin, of unclear significance. No   abdominal aortic aneurysm.  RETROPERITONEUM/LYMPH NODES: Small volume nodes.  ABDOMINAL WALL: Left inguinal postoperative changes.  BONES: Degenerative changes of the bones. Central canal and neural   foramina are not adequately assessed on this study.    IMPRESSION:    Severely inflamed short segment small bowel with perforation. 2cm   air/fluid pocket adjacent to the inflamed small bowel loop. Etiology is   unclear. Follow to resolution. Few small bowel diverticula are noted, as   seen on prior imaging.    Dr. Aparicio discussed these findings with DARI Thao on 11/23/2022 at   3:46PM, with read back.    Coronary artery calcifications.    --- End of Report ---    DONA APARICIO M.D., ATTENDING RADIOLOGIST  This document has been electronically signed. Nov 23 2022  3:50PM    EKG:  Sinus arrhythmia, rate 72, no ischemic changes     [facility] - Anna Brice, Richa Ryder, Kevin, Familia, Vandana Murray  Office Number: [phone]    Initial Consult Note    CHIEF COMPLAINT: Patient is a 82y old  Male who presents with a chief complaint of     HPI: This is an 83 y/o M with BPH, hypothyroidism with recent ILR for possible arrhythmia presented from an Urgent Care Ctr for abdominal distention, diffuse bloating, and pain.  States, has not had BM for few days.   Had same symptoms in June of this year and diagnosed with diverticulitis.  Also c/p palpitation x weeks.  Denies CP, lightheadedness, SOB, GRIMALDO or orthopnea.  Denies N/V, fever, chills.  He tells me that he recently had TTE which was "ok"  Wife at bedside who helps with history    PAST MEDICAL & SURGICAL HISTORY:  Benign non-nodular prostatic hyperplasia, presence of lower urinary tract symptoms unspecified  Memory impairment  Hypothyroid  H/O right inguinal hernia repair  1995  S/P appendectomy    SOCIAL HISTORY:  No tobacco, ethanol, or drug abuse.  FAMILY HISTORY:  Family history of myocardial infarction (Father)    No family history of acute MI or sudden cardiac death.  MEDICATIONS  (STANDING):    MEDICATIONS  (PRN):    Allergies    bacitracin (Rash)    Intolerances    REVIEW OF SYSTEMS:  CONSTITUTIONAL: No weakness, fevers or chills  EYES/ENT: No visual changes;  No vertigo or throat pain   NECK: No pain or stiffness  RESPIRATORY: No cough, wheezing, hemoptysis; No shortness of breath  CARDIOVASCULAR: No chest pain or palpitations  GASTROINTESTINAL: + abdominal pain and distnesion. No nausea, vomiting, or hematemesis; No diarrhea or constipation. No melena or hematochezia.  GENITOURINARY: No dysuria, frequency or hematuria  NEUROLOGICAL: No numbness or weakness  SKIN: No itching or rash  All other review of systems is negative unless indicated above  VITAL SIGNS:   Vital Signs Last 24 Hrs  T(C): 36.3 (23 Nov 2022 11:01), Max: 36.3 (23 Nov 2022 11:01)  T(F): 97.3 (23 Nov 2022 11:01), Max: 97.3 (23 Nov 2022 11:01)  HR: 84 (23 Nov 2022 11:01) (84 - 84)  BP: 153/80 (23 Nov 2022 11:01) (153/80 - 153/80)  BP(mean): --  RR: 18 (23 Nov 2022 11:01) (18 - 18)  SpO2: 98% (23 Nov 2022 11:01) (98% - 98%)    Parameters below as of 23 Nov 2022 11:01  Patient On (Oxygen Delivery Method): room air      I&O's Summary    On Exam:  Constitutional: NAD, alert and oriented x 3  Lungs:  Non-labored, breath sounds are clear bilaterally, No wheezing, rales or rhonchi  Cardiovascular: RRR.  S1 and S2 positive.  No murmurs, rubs, gallops or clicks  Gastrointestinal: Bowel Sounds present, +tender.   Lymph: No peripheral edema. No cervical lymphadenopathy.  Neurological: Alert, no focal deficits  Skin: No rashes or ulcers   Psych:  Mood & affect appropriate.    LABS: All Labs Reviewed:                        15.2   14.61 )-----------( 175      ( 23 Nov 2022 12:00 )             45.2     23 Nov 2022 12:00    138    |  105    |  22     ----------------------------<  114    4.3     |  27     |  1.40     Ca    9.5        23 Nov 2022 12:00  Mg     2.0       23 Nov 2022 12:00    TPro  7.0    /  Alb  3.3    /  TBili  2.0    /  DBili  x      /  AST  14     /  ALT  20     /  AlkPhos  94     23 Nov 2022 12:00    11-23 @ 12:00  TSH: 3.21    RADIOLOGY:    ACC: [id number] EXAM:  CT ABDOMEN AND PELVIS IC                          PROCEDURE DATE:  11/23/2022          INTERPRETATION:  CLINICAL INFORMATION: Abdominal pain, acute,   nonlocalized. Distention for 3-4 days. History of diverticulitis.    COMPARISON: CT abdomen pelvis 6/19/2022    CONTRAST/COMPLICATIONS:  IV Contrast: Omnipaque 350  90 cc administered   10 cc discarded  Oral Contrast: NONE  Complications: None reported at time of study completion    PROCEDURE:  CT of the Abdomen and Pelvis was performed.  Sagittal and coronal reformats were performed.    FINDINGS:    LOWER CHEST: No visualized pleural effusion. Dependent lung parenchymal   scarring. Coronary artery calcifications.    LIVER: Liver size within normal limits.  BILE DUCTS: Nobiliary distention  GALLBLADDER: Unremarkable  SPLEEN: Spleen size within normal limits  PANCREAS: No acute peripancreatic inflammation  ADRENALS: Unremarkable  KIDNEYS/URETERS: No hydronephrosis. Few subcentimeter hypodensities of   the kidneys are too small to characterize. Prominent left-sided   extrarenal pelvis are demonstrated. 2 mm nonobstructing right renal   calculus.    BLADDER: Mildly distended.  REPRODUCTIVE ORGANS: Enlarged prostate.    BOWEL: Stomach is moderately distended. Proximal to mid small bowel is   nondistended. Distal small bowel loops in the right lower quadrant   demonstrates short segment severe focal wall thickening and surrounding   extraluminal air-fluid measuring 2 cm along the mesentery. Additional   tiny droplets of extraluminal air noted. Findings are concerning for   focal small bowel enteritis with perforation. Few small bowel diverticula   are noted, as seen on prior imaging. Postappendectomy. Mild stool burden   of the colon limits evaluation of the colonic mucosa. Slight thickening   of the right colon is likely reactive in nature.  PERITONEUM: Right-sided mesenteric edema. 2 cm focal air/fluid of the   right hemiabdomen image 77 series 2. Trace additional ascites.  VESSELS: Aortoiliac atheromatous changes. Nonspecific slight soft tissue   prominence at the celiac trunk origin, of unclear significance. No   abdominal aortic aneurysm.  RETROPERITONEUM/LYMPH NODES: Small volume nodes.  ABDOMINAL WALL: Left inguinal postoperative changes.  BONES: Degenerative changes of the bones. Central canal and neural   foramina are not adequately assessed on this study.    IMPRESSION:    Severely inflamed short segment small bowel with perforation. 2cm   air/fluid pocket adjacent to the inflamed small bowel loop. Etiology is   unclear. Follow to resolution. Few small bowel diverticula are noted, as   seen on prior imaging.    Dr. Aparicio discussed these findings with DARI Thao on 11/23/2022 at   3:46PM, with read back.    Coronary artery calcifications.    --- End of Report ---    DONA APARICIO M.D., ATTENDING RADIOLOGIST  This document has been electronically signed. Nov 23 2022  3:50PM    EKG:  Sinus arrhythmia, rate 72, no ischemic changes

## 2022-11-23 NOTE — H&P ADULT - HISTORY OF PRESENT ILLNESS
Mr. Cantu is an 82 yoM with pmhx of cognitive decline, idiopathic rash, BPH, cardiac arrhythmia, jejunal diverticulum, and hypothyroidism who presents with three days of abdominal pain and distention. Pt woke up three days ago with distention and pain. Symptoms have not improved and so he presented to urgent care this morning. Additionally pt has not had a bowel movement since his symptoms started. Pt regularly has a soft bowel movement daily. At the urgent care patient had an EKG was referred to go to the emergency room. Denies f/c/n/v/sob/chest pain, difficulty with eating, or blood in stool. Patient had similar symptoms on Father's day 22 and presented to the ED at [place] and was diagnosed with jejunal diverticulum on CT scan. Of note, pt had loop recorder placed ~2 weeks however it has not been able to be interrogated due to synching difficulties. Pt is slated to see his cardiologist, Dr. Steve Michael, next week. Pt has had some warmthness in his chest due to loop recorder.     PMHX: dementia, arrhythmia, hypothyroidism, idiopathic skin "rash", jejunal diverticulum  PSHX: open appendectomy (age 14), open R inguinal hernia repair (Eb, 2016), R inguinal hernia repair ()  Ax: bacitracin  Meds:   1. cyclosporine mod. 25 mg 3 capsules Sun-Friday weekly  2. Donepezil 5mg nightly  3. Metoprolol ER 25mg daily  4. Synthroid 100mcg daily  5. Tamsulosin 0.4mg daily    SocHx: denies tobacco use, denies ETOH, denies illicit drug use  FamHx: heart disease (father  at age 51yo)

## 2022-11-23 NOTE — H&P ADULT - ASSESSMENT
Mr. Cantu is an 82 yoM with hx of jejunal diverticulum, BPH, cognitive decline, idiopathic skin condition, cardiac arrhythmia who presents with abdominal pain and distention for 3 days. On exam, pt is tender and diffusely distended. Labs significant for elevated wbc, t bili, troponin. CT findings significant for bowel perforation. Plan for OR     Plan:   - Obtain preop labs  - Consult cardiology  - Consult internal medicine  - NPO  - IVF  - OR for diagnostic laparoscopy, possible small bowel resection, possible open, possible ostomy    Case discussed with Dr. Chiu    Dispo: pending OR  Mr. Cantu is an 82 yoM with hx of jejunal diverticulum, BPH, cognitive decline, idiopathic skin condition, cardiac arrhythmia who presents with abdominal pain and distention for 3 days. On exam, pt is tender and diffusely distended. Labs significant for elevated wbc, t bili, troponin. CT findings significant for bowel perforation. Plan for OR     Plan:   - Obtain preop labs  - Consult cardiology  - Consult internal medicine  - NPO  - IVF  - OR for diagnostic laparoscopy, possible small bowel resection, possible open, possible ostomy    Case discussed with Dr. Chiu.  Surg. Att.  Pt. seen and examined. He is very diffusely tender with max on the right.  Ct was reviewed and indicates perforation of the sb.  Complicating factor is that pt for over 20 years is on cyclosporin which can affect wound healing.  Patient was discussed with Medicine and cardiology.  Discussion was held with pt. ,his wife and son in law , who's a physician          Dispo: pending OR

## 2022-11-23 NOTE — ED PROVIDER NOTE - OBJECTIVE STATEMENT
82 M hx memory impairment, bph, hypothyroid, appendectomy sent from urgent care due to abd distention/pain, no BM for the past few days. Seen for similar June 2022, diagnosed with diverticulitis. Also states has been having intermittent palpitations x days vs weeks. Has loop recorder but states it is "not hooked up". Denies f/c, cp, sob, n/v, dysuria.    pmd bendit  gi nima  cv eulalia

## 2022-11-23 NOTE — CONSULT NOTE ADULT - SUBJECTIVE AND OBJECTIVE BOX
Patient is a 82y old  Male who presents with a chief complaint of small bowel perforation (23 Nov 2022 17:21)    HPI:  Mr. Cantu is an 82 yoM with pmhx of cognitive decline, idiopathic rash, BPH, cardiac arrhythmia, jejunal diverticulum, and hypothyroidism who presents with three days of abdominal pain and distention. Pt woke up three days ago with distention and pain. Symptoms have not improved and so he presented to urgent care this morning. Additionally pt has not had a bowel movement since his symptoms started. Pt regularly has a soft bowel movement daily. At the urgent care patient had an EKG was referred to go to the emergency room. Denies f/c/n/v/sob/chest pain, difficulty with eating, or blood in stool. Patient had similar symptoms on Father's day 6/19/22 and presented to the ED at [place] and was diagnosed with jejunal diverticulum on CT scan. Of note, pt had loop recorder placed ~2 weeks however it has not been able to be interrogated due to syncing difficulties. Pt is slated to see his cardiologist, Dr. Steve Dominguez, next week. Pt has had some warmthness in his chest due to loop recorder.     Patient states he feels fine currently, complains of mild pain in his abdomen.     I&O's Summary      PAST MEDICAL & SURGICAL HISTORY:  Benign non-nodular prostatic hyperplasia, presence of lower urinary tract symptoms unspecified      Memory impairment      Hypothyroid      Cardiac dysrhythmia      H/O right inguinal hernia repair  1995      S/P appendectomy      S/P right inguinal hernia repair  2016, Dr. Parson          Allergies    bacitracin (Rash)    Intolerances        SOCIAL HISTORY  Alcohol:  Tobacco:  Illicit substance use: denies    FAMILY HISTORY:  Family history of myocardial infarction (Father)        Home Medications:  donepezil 5 mg oral tablet: 1 tab(s) orally once a day (at bedtime) (23 Nov 2022 17:55)  Gengraf 100 mg oral capsule:  orally , once daily (07 Mar 2016 17:28)  metoprolol succinate 25 mg oral tablet, extended release: 1 tab(s) orally once a day (23 Nov 2022 17:55)  Synthroid 100 mcg (0.1 mg) oral tablet: 1 tab(s) orally once a day (23 Nov 2022 17:55)  tamsulosin 0.4 mg oral capsule: 1 cap(s) orally once a day (07 Mar 2016 17:28)        LABS:                        15.2   14.61 )-----------( 175      ( 23 Nov 2022 12:00 )             45.2     11-23    138  |  105  |  22  ----------------------------<  114<H>  4.3   |  27  |  1.40<H>    Ca    9.5      23 Nov 2022 12:00  Mg     2.0     11-23    TPro  7.0  /  Alb  3.3  /  TBili  2.0<H>  /  DBili  x   /  AST  14<L>  /  ALT  20  /  AlkPhos  94  11-23    PT/INR - ( 23 Nov 2022 18:00 )   PT: 16.6 sec;   INR: 1.41 ratio         PTT - ( 23 Nov 2022 18:00 )  PTT:31.1 sec    CAPILLARY BLOOD GLUCOSE                MEDICATIONS  (STANDING):  sodium chloride 0.9%. 1000 milliLiter(s) (100 mL/Hr) IV Continuous <Continuous>    MEDICATIONS  (PRN):      REVIEW OF SYSTEMS:  CONSTITUTIONAL: denies fever, chills, fatigue, weakness  HEENT: denies blurred vision, sore throat  SKIN: denies new lesions, rash  CARDIOVASCULAR: denies chest pain, chest pressure, palpitations  RESPIRATORY: denies shortness of breath, sputum production  GASTROINTESTINAL: admits abdominal pain and distention  GENITOURINARY: denies dysuria, discharge  NEUROLOGICAL: denies numbness, headache, focal weakness  MUSCULOSKELETAL: denies new joint pain, muscle aches  HEMATOLOGIC: denies gross bleeding, bruising      RADIOLOGY & ADDITIONAL TESTS:    EKG:    Imaging Personally Reviewed:  [x] YES  [ ] NO    Consultant(s) Notes Reviewed:  [x] YES  [ ] NO        PHYSICAL EXAM:  T(F): 97.3 (11-23-22 @ 11:01), Max: 97.3 (11-23-22 @ 11:01)  HR: 84 (11-23-22 @ 11:01) (84 - 84)  BP: 153/80 (11-23-22 @ 11:01) (153/80 - 153/80)  RR: 18 (11-23-22 @ 11:01) (18 - 18)  SpO2: 98% (11-23-22 @ 11:01) (98% - 98%)    GENERAL: NAD, sitting in bed  HEAD:  Atraumatic, Normocephalic  EYES: EOMI, PERRL, conjunctiva and sclera clear  ENMT: No tonsillar erythema, exudates, or enlargement; Moist mucous membranes  NECK: Supple, No JVD, Normal thyroid  NERVOUS SYSTEM:  Alert & Oriented X3, Moves all extremities, Sensation to light touch intact  CHEST/LUNG: Clear to auscultation bilaterally; No wheezes, rales or rhonchi  HEART: RRR, S1, S2; No murmurs, rubs, or gallops  ABDOMEN: Soft, +TTP epigastric region, +distention, hypoactive bowel sounds  EXTREMITIES:  2+ Peripheral Pulses, No clubbing, cyanosis, or edema  LYMPH: No lymphadenopathy noted  SKIN: No rashes or lesions    Care Discussed with Consultants/Other Providers [x] YES  [ ] NO

## 2022-11-23 NOTE — ED ADULT TRIAGE NOTE - CHIEF COMPLAINT QUOTE
for 3 days, I have had abdominal pain with distention.  no bm in three days (since onset).  denies any n/v.  [facility] performed and I was told to come to the er.  (incidentally, I have been having a fast heart rate lately (not present) and I have a loop recorder but it was never hooked up.

## 2022-11-23 NOTE — CONSULT NOTE ADULT - ASSESSMENT
Mr. Cantu is an 82 yoM with pmhx of cognitive decline, idiopathic rash, BPH, cardiac arrhythmia, jejunal diverticulum, and hypothyroidism who presents with three days of abdominal pain and distention being admitted for bowel perforation.    #Small bowel Perforation  #Arrythmia  #Hypothyroidism  #BPH    -Admit to Surgery  -Cardiology evaluation for history of arrythmia and recent loop recorder placement. EKG with sinus arrythmia, no ischemic changes noted  -Can continue metoprolol post-op  -Continue synthroid, may need to transition to IV as patient likely will be NPO post procedure  -Patient requires emergent surgery- patient is intermediate risk for intermediate-high risk procedure and is medically optimized for emergent procedure  -Plan of care discussed with patient, wife, Surgery, Cardiology Mr. Cantu is an 82 yoM with pmhx of cognitive decline, idiopathic rash, BPH, cardiac arrhythmia, jejunal diverticulum, and hypothyroidism who presents with three days of abdominal pain and distention being admitted for bowel perforation.    #Small bowel Perforation  #Arrythmia  #Hypothyroidism  #BPH    -Admit to Surgery  -Cardiology evaluation for history of arrythmia and recent loop recorder placement. EKG with sinus arrythmia, no ischemic changes noted  -Can continue metoprolol post-op  -Consider ID consult to help with antibiotic management post-op  -Continue synthroid, may need to transition to IV as patient likely will be NPO post procedure  -Patient requires emergent surgery- patient is intermediate risk for intermediate-high risk procedure and is medically optimized for emergent procedure  -Plan of care discussed with patient, wife, Surgery, Cardiology

## 2022-11-23 NOTE — CONSULT NOTE ADULT - NS ATTEND AMEND GEN_ALL_CORE FT
I have personally seen and examined the patient in detail.  I have spoken to the provider regarding the assessment and plan of care.  I have made changes to the note accordingly.     Pt has no active ischemia, decompensated heart failure, unstable arrhythmia, or severe stenotic valvular disease.  Pt has no modifiable active cardiac risk factors and in the setting of intermediate risk procedure, pt is optimized as best as possible from cardiovascular standpoint to proceed with planned procedure with routine hemodynamic monitoring.   monitor closely on tele  pt likely takes metoprolol as an outpt, can restart postop if BP allows

## 2022-11-24 LAB
A1C WITH ESTIMATED AVERAGE GLUCOSE RESULT: 5.3 % — SIGNIFICANT CHANGE UP (ref 4–5.6)
ANION GAP SERPL CALC-SCNC: 7 MMOL/L — SIGNIFICANT CHANGE UP (ref 5–17)
BASOPHILS # BLD AUTO: 0 K/UL — SIGNIFICANT CHANGE UP (ref 0–0.2)
BASOPHILS NFR BLD AUTO: 0 % — SIGNIFICANT CHANGE UP (ref 0–2)
BUN SERPL-MCNC: 19 MG/DL — SIGNIFICANT CHANGE UP (ref 7–23)
CALCIUM SERPL-MCNC: 8.7 MG/DL — SIGNIFICANT CHANGE UP (ref 8.5–10.1)
CHLORIDE SERPL-SCNC: 106 MMOL/L — SIGNIFICANT CHANGE UP (ref 96–108)
CO2 SERPL-SCNC: 28 MMOL/L — SIGNIFICANT CHANGE UP (ref 22–31)
CREAT SERPL-MCNC: 1.3 MG/DL — SIGNIFICANT CHANGE UP (ref 0.5–1.3)
EGFR: 55 ML/MIN/1.73M2 — LOW
EOSINOPHIL # BLD AUTO: 0 K/UL — SIGNIFICANT CHANGE UP (ref 0–0.5)
EOSINOPHIL NFR BLD AUTO: 0 % — SIGNIFICANT CHANGE UP (ref 0–6)
ESTIMATED AVERAGE GLUCOSE: 105 MG/DL — SIGNIFICANT CHANGE UP (ref 68–114)
GLUCOSE SERPL-MCNC: 146 MG/DL — HIGH (ref 70–99)
HCT VFR BLD CALC: 41.5 % — SIGNIFICANT CHANGE UP (ref 39–50)
HGB BLD-MCNC: 14 G/DL — SIGNIFICANT CHANGE UP (ref 13–17)
LYMPHOCYTES # BLD AUTO: 0.36 K/UL — LOW (ref 1–3.3)
LYMPHOCYTES # BLD AUTO: 3 % — LOW (ref 13–44)
MAGNESIUM SERPL-MCNC: 1.9 MG/DL — SIGNIFICANT CHANGE UP (ref 1.6–2.6)
MCHC RBC-ENTMCNC: 30.3 PG — SIGNIFICANT CHANGE UP (ref 27–34)
MCHC RBC-ENTMCNC: 33.7 GM/DL — SIGNIFICANT CHANGE UP (ref 32–36)
MCV RBC AUTO: 89.8 FL — SIGNIFICANT CHANGE UP (ref 80–100)
MONOCYTES # BLD AUTO: 0.84 K/UL — SIGNIFICANT CHANGE UP (ref 0–0.9)
MONOCYTES NFR BLD AUTO: 7 % — SIGNIFICANT CHANGE UP (ref 2–14)
NEUTROPHILS # BLD AUTO: 10.69 K/UL — HIGH (ref 1.8–7.4)
NEUTROPHILS NFR BLD AUTO: 84 % — HIGH (ref 43–77)
NRBC # BLD: SIGNIFICANT CHANGE UP /100 WBCS (ref 0–0)
PHOSPHATE SERPL-MCNC: 2.7 MG/DL — SIGNIFICANT CHANGE UP (ref 2.5–4.5)
PLATELET # BLD AUTO: 151 K/UL — SIGNIFICANT CHANGE UP (ref 150–400)
POTASSIUM SERPL-MCNC: 4.4 MMOL/L — SIGNIFICANT CHANGE UP (ref 3.5–5.3)
POTASSIUM SERPL-SCNC: 4.4 MMOL/L — SIGNIFICANT CHANGE UP (ref 3.5–5.3)
RBC # BLD: 4.62 M/UL — SIGNIFICANT CHANGE UP (ref 4.2–5.8)
RBC # FLD: 13.2 % — SIGNIFICANT CHANGE UP (ref 10.3–14.5)
SODIUM SERPL-SCNC: 141 MMOL/L — SIGNIFICANT CHANGE UP (ref 135–145)
WBC # BLD: 12.01 K/UL — HIGH (ref 3.8–10.5)
WBC # FLD AUTO: 12.01 K/UL — HIGH (ref 3.8–10.5)

## 2022-11-24 PROCEDURE — 93010 ELECTROCARDIOGRAM REPORT: CPT

## 2022-11-24 PROCEDURE — 99232 SBSQ HOSP IP/OBS MODERATE 35: CPT

## 2022-11-24 RX ORDER — DONEPEZIL HYDROCHLORIDE 10 MG/1
1 TABLET, FILM COATED ORAL
Qty: 0 | Refills: 0 | DISCHARGE

## 2022-11-24 RX ORDER — BENZOCAINE 10 %
1 GEL (GRAM) MUCOUS MEMBRANE ONCE
Refills: 0 | Status: COMPLETED | OUTPATIENT
Start: 2022-11-24 | End: 2022-11-24

## 2022-11-24 RX ORDER — METOPROLOL TARTRATE 50 MG
1 TABLET ORAL
Qty: 0 | Refills: 0 | DISCHARGE

## 2022-11-24 RX ORDER — CYCLOSPORINE 100 MG/1
75 CAPSULE ORAL DAILY
Refills: 0 | Status: DISCONTINUED | OUTPATIENT
Start: 2022-11-24 | End: 2022-11-28

## 2022-11-24 RX ORDER — LEVOTHYROXINE SODIUM 125 MCG
1 TABLET ORAL
Qty: 0 | Refills: 0 | DISCHARGE

## 2022-11-24 RX ADMIN — Medication 1000 MILLIGRAM(S): at 08:57

## 2022-11-24 RX ADMIN — Medication 400 MILLIGRAM(S): at 18:29

## 2022-11-24 RX ADMIN — PIPERACILLIN AND TAZOBACTAM 25 GRAM(S): 4; .5 INJECTION, POWDER, LYOPHILIZED, FOR SOLUTION INTRAVENOUS at 21:22

## 2022-11-24 RX ADMIN — Medication 400 MILLIGRAM(S): at 23:28

## 2022-11-24 RX ADMIN — Medication 1.25 MILLIGRAM(S): at 18:30

## 2022-11-24 RX ADMIN — Medication 400 MILLIGRAM(S): at 08:04

## 2022-11-24 RX ADMIN — PIPERACILLIN AND TAZOBACTAM 25 GRAM(S): 4; .5 INJECTION, POWDER, LYOPHILIZED, FOR SOLUTION INTRAVENOUS at 13:56

## 2022-11-24 RX ADMIN — Medication 1000 MILLIGRAM(S): at 18:43

## 2022-11-24 RX ADMIN — DONEPEZIL HYDROCHLORIDE 5 MILLIGRAM(S): 10 TABLET, FILM COATED ORAL at 21:22

## 2022-11-24 RX ADMIN — Medication 1.25 MILLIGRAM(S): at 05:24

## 2022-11-24 RX ADMIN — ENOXAPARIN SODIUM 40 MILLIGRAM(S): 100 INJECTION SUBCUTANEOUS at 13:56

## 2022-11-24 RX ADMIN — Medication 50 MICROGRAM(S): at 21:23

## 2022-11-24 RX ADMIN — TAMSULOSIN HYDROCHLORIDE 0.4 MILLIGRAM(S): 0.4 CAPSULE ORAL at 21:22

## 2022-11-24 RX ADMIN — CYCLOSPORINE 75 MILLIGRAM(S): 100 CAPSULE ORAL at 18:16

## 2022-11-24 RX ADMIN — Medication 1 SPRAY(S): at 18:43

## 2022-11-24 RX ADMIN — PIPERACILLIN AND TAZOBACTAM 25 GRAM(S): 4; .5 INJECTION, POWDER, LYOPHILIZED, FOR SOLUTION INTRAVENOUS at 05:24

## 2022-11-24 RX ADMIN — Medication 1.25 MILLIGRAM(S): at 00:48

## 2022-11-24 RX ADMIN — PANTOPRAZOLE SODIUM 40 MILLIGRAM(S): 20 TABLET, DELAYED RELEASE ORAL at 13:57

## 2022-11-24 NOTE — PROGRESS NOTE ADULT - SUBJECTIVE AND OBJECTIVE BOX
Patient is a 82y old  Male who presents with a chief complaint of small bowel perforation (24 Nov 2022 11:51)      INTERVAL /OVERNIGHT EVENTS: now with NGT    MEDICATIONS  (STANDING):  acetaminophen   IVPB .. 1000 milliGRAM(s) IV Intermittent once  acetaminophen   IVPB .. 1000 milliGRAM(s) IV Intermittent once  cycloSPORINE  , modified (GENGRAF) 75 milliGRAM(s) Oral daily  donepezil 5 milliGRAM(s) Oral at bedtime  enoxaparin Injectable 40 milliGRAM(s) SubCutaneous every 24 hours  lactated ringers. 1000 milliLiter(s) (75 mL/Hr) IV Continuous <Continuous>  levothyroxine Injectable 50 MICROGram(s) IV Push at bedtime  metoprolol tartrate Injectable 1.25 milliGRAM(s) IV Push every 6 hours  pantoprazole  Injectable 40 milliGRAM(s) IV Push daily  piperacillin/tazobactam IVPB.. 3.375 Gram(s) IV Intermittent every 8 hours  tamsulosin 0.4 milliGRAM(s) Oral at bedtime    MEDICATIONS  (PRN):  HYDROmorphone  Injectable 0.25 milliGRAM(s) IV Push every 4 hours PRN Moderate Pain (4 - 6)  HYDROmorphone  Injectable 0.5 milliGRAM(s) IV Push every 4 hours PRN Severe Pain (7 - 10)  ketorolac   Injectable 15 milliGRAM(s) IV Push every 6 hours PRN Mild Pain (1 - 3)  ondansetron Injectable 4 milliGRAM(s) IV Push every 6 hours PRN Nausea      Allergies    bacitracin (Rash)    Intolerances        REVIEW OF SYSTEMS:  CONSTITUTIONAL: No fever, weight loss, or fatigue  EYES: No eye pain, visual disturbances, or discharge  ENMT:  No difficulty hearing, tinnitus, vertigo; No sinus or throat pain  NECK: No pain or stiffness  RESPIRATORY: No cough, wheezing, chills or hemoptysis; No shortness of breath  CARDIOVASCULAR: No chest pain, palpitations, dizziness, or leg swelling  GASTROINTESTINAL: No abdominal or epigastric pain. No nausea, vomiting, or hematemesis; No diarrhea or constipation. No melena or hematochezia.  GENITOURINARY: No dysuria, frequency, hematuria, or incontinence  NEUROLOGICAL: No headaches, memory loss, loss of strength, numbness, or tremors  SKIN: No itching, burning, rashes, or lesions   LYMPH NODES: No enlarged glands  ENDOCRINE: No heat or cold intolerance; No hair loss; No polydipsia or polyuria  MUSCULOSKELETAL: No joint pain or swelling; No muscle, back, or extremity pain  PSYCHIATRIC: No depression, anxiety, mood swings, or difficulty sleeping  HEME/LYMPH: No easy bruising, or bleeding gums  ALLERGY AND IMMUNOLOGIC: No hives or eczema    Vital Signs Last 24 Hrs  T(C): 36.6 (24 Nov 2022 12:27), Max: 37 (23 Nov 2022 21:57)  T(F): 97.8 (24 Nov 2022 12:27), Max: 98.6 (23 Nov 2022 21:57)  HR: 58 (24 Nov 2022 12:27) (58 - 94)  BP: 115/67 (24 Nov 2022 12:27) (115/67 - 149/76)  BP(mean): --  RR: 18 (24 Nov 2022 12:27) (14 - 25)  SpO2: 91% (24 Nov 2022 12:27) (91% - 96%)    Parameters below as of 24 Nov 2022 12:27  Patient On (Oxygen Delivery Method): room air        PHYSICAL EXAM:  GENERAL: NAD, well-groomed, well-developed  HEAD:  Atraumatic, Normocephalic  EYES: EOMI, PERRLA, conjunctiva and sclera clear  ENMT: No tonsillar erythema, exudates, or enlargement; Moist mucous membranes, Good dentition, No lesions  NECK: Supple, No JVD, Normal thyroid  NERVOUS SYSTEM:  Alert & Oriented X3, Good concentration; Motor Strength 5/5 B/L upper and lower extremities; DTRs 2+ intact and symmetric  CHEST/LUNG: Clear to auscultation bilaterally; No rales, rhonchi, wheezing, or rubs  HEART: Regular rate and rhythm; No murmurs, rubs, or gallops  ABDOMEN: Soft, Nontender, Nondistended; Bowel sounds present  EXTREMITIES:  2+ Peripheral Pulses, No clubbing, cyanosis, or edema  LYMPH: No lymphadenopathy noted  SKIN: No rashes or lesions    LABS:                        14.0   12.01 )-----------( 151      ( 24 Nov 2022 06:15 )             41.5     24 Nov 2022 06:15    141    |  106    |  19     ----------------------------<  146    4.4     |  28     |  1.30     Ca    8.7        24 Nov 2022 06:15  Phos  2.7       24 Nov 2022 06:15  Mg     1.9       24 Nov 2022 06:15      PT/INR - ( 23 Nov 2022 18:00 )   PT: 16.6 sec;   INR: 1.41 ratio         PTT - ( 23 Nov 2022 18:00 )  PTT:31.1 sec    CAPILLARY BLOOD GLUCOSE          RADIOLOGY & ADDITIONAL TESTS:    Notes Reviewed:  [x ] YES  [ ] NO    Care Discussed with Consultants/Other Providers [x ] YES  [ ] NO

## 2022-11-24 NOTE — PATIENT PROFILE ADULT - FALL HARM RISK - HARM RISK INTERVENTIONS

## 2022-11-24 NOTE — PROGRESS NOTE ADULT - SUBJECTIVE AND OBJECTIVE BOX
SUBJECTIVE:  Patient seen and examined at bedside.  No overnight events.  Patient with no new complaints at this time, NPO/NGT, admits to flatus and bowel movement.  Patient denies any fevers, chills, chest pain, shortness of breath, nausea, vomiting or diarrhea.    Vital Signs Last 24 Hrs  T(C): 36.7 (24 Nov 2022 05:16), Max: 37 (23 Nov 2022 21:57)  T(F): 98 (24 Nov 2022 05:16), Max: 98.6 (23 Nov 2022 21:57)  HR: 66 (24 Nov 2022 05:16) (66 - 94)  BP: 118/69 (24 Nov 2022 05:16) (118/69 - 153/80)  BP(mean): --  RR: 19 (24 Nov 2022 05:16) (14 - 25)  SpO2: 94% (24 Nov 2022 05:16) (93% - 98%)    Parameters below as of 24 Nov 2022 05:16  Patient On (Oxygen Delivery Method): room air        PHYSICAL EXAM:  GENERAL: No acute distress, well-developed  HEAD:  Atraumatic, Normocephalic  ABDOMEN: Soft, diffuse appropriate tenderness, mildly-distended  NEUROLOGY: responding appropriately, no focal deficits    I&O's Summary    23 Nov 2022 07:01  -  24 Nov 2022 05:51  --------------------------------------------------------  IN: 150 mL / OUT: 95 mL / NET: 55 mL      I&O's Detail    23 Nov 2022 07:01  -  24 Nov 2022 05:51  --------------------------------------------------------  IN:    Lactated Ringers: 150 mL  Total IN: 150 mL    OUT:    Indwelling Catheter - Urethral (mL): 85 mL    Nasogastric/Oral tube (mL): 10 mL  Total OUT: 95 mL    Total NET: 55 mL        MEDICATIONS  (STANDING):  acetaminophen   IVPB .. 1000 milliGRAM(s) IV Intermittent once  acetaminophen   IVPB .. 1000 milliGRAM(s) IV Intermittent once  acetaminophen   IVPB .. 1000 milliGRAM(s) IV Intermittent once  cycloSPORINE  , modified (GENGRAF) 100 milliGRAM(s) Oral daily  donepezil 5 milliGRAM(s) Oral at bedtime  enoxaparin Injectable 40 milliGRAM(s) SubCutaneous every 24 hours  lactated ringers. 1000 milliLiter(s) (75 mL/Hr) IV Continuous <Continuous>  levothyroxine Injectable 50 MICROGram(s) IV Push at bedtime  metoprolol tartrate Injectable 1.25 milliGRAM(s) IV Push every 6 hours  pantoprazole  Injectable 40 milliGRAM(s) IV Push daily  piperacillin/tazobactam IVPB.. 3.375 Gram(s) IV Intermittent every 8 hours  tamsulosin 0.4 milliGRAM(s) Oral at bedtime    MEDICATIONS  (PRN):  HYDROmorphone  Injectable 0.25 milliGRAM(s) IV Push every 4 hours PRN Moderate Pain (4 - 6)  HYDROmorphone  Injectable 0.5 milliGRAM(s) IV Push every 4 hours PRN Severe Pain (7 - 10)  ketorolac   Injectable 15 milliGRAM(s) IV Push every 6 hours PRN Mild Pain (1 - 3)  ondansetron Injectable 4 milliGRAM(s) IV Push every 6 hours PRN Nausea    LABS:                        15.2   14.61 )-----------( 175      ( 23 Nov 2022 12:00 )             45.2     11-23    138  |  105  |  22  ----------------------------<  114<H>  4.3   |  27  |  1.40<H>    Ca    9.5      23 Nov 2022 12:00  Mg     2.0     11-23    TPro  7.0  /  Alb  3.3  /  TBili  2.0<H>  /  DBili  x   /  AST  14<L>  /  ALT  20  /  AlkPhos  94  11-23    PT/INR - ( 23 Nov 2022 18:00 )   PT: 16.6 sec;   INR: 1.41 ratio         PTT - ( 23 Nov 2022 18:00 )  PTT:31.1 sec    RADIOLOGY & ADDITIONAL STUDIES:    ASSESSMENT    82yMale patient S/P SBR for SB perforation doing well with NGT and li.    Plan:  - incentive spirometer  - maintain li until more ambulation and better u/o  - pain control  - OOB  - NPO, IVF, NGT  +li  - serial abdominal exams  - labs in am    Surgical Team Contact Information  Spectralink: Ext: [phone] or [phone]  Pager: [phone]

## 2022-11-24 NOTE — PROGRESS NOTE ADULT - ASSESSMENT
81 y/o M with BPH, hypothyroidism with recent ILR for possible arrhythmia presented from an Urgent [facility] for abdominal distention, diffuse bloating, and pain.  He was found to have perforated bowel and is planned to have urgent surgery    Cardiac Optimization/Cardiac Arrhythmia  - s/p ILR implantations (2 wks ago) for palpitations.    - EKG showed sinus arrhythmias with no ischemia. Denies anginal complaints   - no events on tele, SR 60's overnight    - p/w abd pain and distention, + bowel perforation, s/p SBR POD#1, surgery following   - tolerated well from CV POV     - BP controlled  - Not on home BP med and no Hx of HTN.  Would not start treating now    - pain management per primary   - Monitor electrolytes, replete to keep K>4 and Mag>2    - Will continue to follow.    Kathy Sellers, [facility]  Nurse Practitioner - Cardiology   Spectra #[phone]/ [phone]

## 2022-11-24 NOTE — PROGRESS NOTE ADULT - ASSESSMENT
As in above PA notation.  Mr. Cantu personally seen and examined during PM rounds.  No nausea or vomiting.  Unhappy about NG, but glad of no N/V.  Reports good pain control.  No flatus.  Vitals non-suggestive.  Wounds clean and abdomen soft with appropriate incisional tenderness.  Labs reassuring.  Clinically, slowly improving overall.  Surgically, stable for present.  To continue current supportive care with consideration of Knight out in AM, but will maintain NG for now given proximal anastomosis.  Reviewed with patient in detail and Surgical PA.

## 2022-11-24 NOTE — PROGRESS NOTE ADULT - SUBJECTIVE AND OBJECTIVE BOX
Post Operative Note  Patient: KLAUDIA TORRES 82y (1940) Male   MRN: [id number]  Location: [place] [address]  Visit: 11-23-22 Inpatient  Date: 11-24-22 @ 00:55    Procedure: S/P SBR    Subjective:   Patient seen and examined at bedside.  No events post-operatively.  Patient with no new complaints at this time besides mild post operative pain,NPO/NGT, denies flatus and bowel movement.+Li.  Patient denies any fevers, chills, chest pain, shortness of breath,  nausea, vomiting or diarrhea.    Objective:  Vitals: T(F): 98.2 (11-24-22 @ 00:26), Max: 98.6 (11-23-22 @ 21:57)  HR: 76 (11-24-22 @ 00:26)  BP: 135/67 (11-24-22 @ 00:26) (129/71 - 153/80)  RR: 17 (11-24-22 @ 00:26)  SpO2: 95% (11-24-22 @ 00:26)  Vent Settings:     In:   11-23-22 @ 07:01  -  11-24-22 @ 00:55  --------------------------------------------------------  IN: 150 mL      IV Fluids: lactated ringers. 1000 milliLiter(s) (75 mL/Hr) IV Continuous <Continuous>      Out:   11-23-22 @ 07:01  -  11-24-22 @ 00:55  --------------------------------------------------------  OUT: 95 mL      EBL:     Voided Urine:   11-23-22 @ 07:01  -  11-24-22 @ 00:55  --------------------------------------------------------  OUT: 95 mL      Li Catheter: yes no   Drains:   ALAINA:    ,   Chest Tube:      NG Tube:   11-23-22 @ 07:01  -  11-24-22 @ 00:55  --------------------------------------------------------  OUT: 10 mL        PHYSICAL EXAM:  GENERAL: No acute distress, well-developed  HEAD:  Atraumatic, Normocephalic  ABDOMEN: Soft, appropriately-tender, minimally-distended; incisions clean dry and intact.  NEUROLOGY: A&O x 3, no focal deficits    Medications: [Standing]  cycloSPORINE  , modified (GENGRAF) 100 milliGRAM(s) Oral daily  donepezil 5 milliGRAM(s) Oral at bedtime  HYDROmorphone  Injectable 0.25 milliGRAM(s) IV Push every 4 hours PRN  HYDROmorphone  Injectable 0.5 milliGRAM(s) IV Push every 4 hours PRN  ketorolac   Injectable 15 milliGRAM(s) IV Push every 6 hours PRN  lactated ringers. 1000 milliLiter(s) IV Continuous <Continuous>  levothyroxine Injectable 50 MICROGram(s) IV Push at bedtime  metoprolol tartrate Injectable 1.25 milliGRAM(s) IV Push every 6 hours  ondansetron Injectable 4 milliGRAM(s) IV Push every 6 hours PRN  pantoprazole  Injectable 40 milliGRAM(s) IV Push daily  piperacillin/tazobactam IVPB.. 3.375 Gram(s) IV Intermittent every 8 hours  tamsulosin 0.4 milliGRAM(s) Oral at bedtime    Medications: [PRN]  cycloSPORINE  , modified (GENGRAF) 100 milliGRAM(s) Oral daily  donepezil 5 milliGRAM(s) Oral at bedtime  HYDROmorphone  Injectable 0.25 milliGRAM(s) IV Push every 4 hours PRN  HYDROmorphone  Injectable 0.5 milliGRAM(s) IV Push every 4 hours PRN  ketorolac   Injectable 15 milliGRAM(s) IV Push every 6 hours PRN  lactated ringers. 1000 milliLiter(s) IV Continuous <Continuous>  levothyroxine Injectable 50 MICROGram(s) IV Push at bedtime  metoprolol tartrate Injectable 1.25 milliGRAM(s) IV Push every 6 hours  ondansetron Injectable 4 milliGRAM(s) IV Push every 6 hours PRN  pantoprazole  Injectable 40 milliGRAM(s) IV Push daily  piperacillin/tazobactam IVPB.. 3.375 Gram(s) IV Intermittent every 8 hours  tamsulosin 0.4 milliGRAM(s) Oral at bedtime    Labs:                        15.2   14.61 )-----------( 175      ( 23 Nov 2022 12:00 )             45.2     11-23    138  |  105  |  22  ----------------------------<  114<H>  4.3   |  27  |  1.40<H>    Ca    9.5      23 Nov 2022 12:00  Mg     2.0     11-23    TPro  7.0  /  Alb  3.3  /  TBili  2.0<H>  /  DBili  x   /  AST  14<L>  /  ALT  20  /  AlkPhos  94  11-23    PT/INR - ( 23 Nov 2022 18:00 )   PT: 16.6 sec;   INR: 1.41 ratio         PTT - ( 23 Nov 2022 18:00 )  PTT:31.1 sec      Imaging:  No post-op imaging studies    Assessment:  82yMale patient S/P SBR for SB perforation doing well    Plan:  - incentive spirometer  - pain control  - OOB  - NPO, IVF   +li  - serial abdominal exams  - labs in am    Surgical Team Contact Information  Spectralink: Ext: [phone] or [phone]  Pager: [phone]    Date/Time: 11-24-22 @ 00:55

## 2022-11-24 NOTE — PATIENT PROFILE ADULT - FUNCTIONAL ASSESSMENT - BASIC MOBILITY 6.
2-calculated by average/Not able to assess (calculate score using [facility] averaging method)

## 2022-11-24 NOTE — PROGRESS NOTE ADULT - SUBJECTIVE AND OBJECTIVE BOX
[facility] -- Anna Brice,  Richa, Familia Brown Savella, Goodger  Office # [phone]    Follow Up:  preoptimization, cardiac arrythmia     Subjective/Observations: seen and examined, denies chest pain, dyspnea, palpitations, tolerating O2 via NC, +NGT, ABX infusing, c/o surgical site and throat  pain     REVIEW OF SYSTEMS: All other review of systems is negative unless indicated above  PAST MEDICAL & SURGICAL HISTORY:  Benign non-nodular prostatic hyperplasia, presence of lower urinary tract symptoms unspecified      Memory impairment      Hypothyroid      Cardiac dysrhythmia      H/O right inguinal hernia repair  1995      S/P appendectomy      S/P right inguinal hernia repair  2016, Dr. Parson        MEDICATIONS  (STANDING):  acetaminophen   IVPB .. 1000 milliGRAM(s) IV Intermittent once  acetaminophen   IVPB .. 1000 milliGRAM(s) IV Intermittent once  cycloSPORINE  , modified (GENGRAF) 100 milliGRAM(s) Oral daily  donepezil 5 milliGRAM(s) Oral at bedtime  enoxaparin Injectable 40 milliGRAM(s) SubCutaneous every 24 hours  lactated ringers. 1000 milliLiter(s) (75 mL/Hr) IV Continuous <Continuous>  levothyroxine Injectable 50 MICROGram(s) IV Push at bedtime  metoprolol tartrate Injectable 1.25 milliGRAM(s) IV Push every 6 hours  pantoprazole  Injectable 40 milliGRAM(s) IV Push daily  piperacillin/tazobactam IVPB.. 3.375 Gram(s) IV Intermittent every 8 hours  tamsulosin 0.4 milliGRAM(s) Oral at bedtime    MEDICATIONS  (PRN):  HYDROmorphone  Injectable 0.25 milliGRAM(s) IV Push every 4 hours PRN Moderate Pain (4 - 6)  HYDROmorphone  Injectable 0.5 milliGRAM(s) IV Push every 4 hours PRN Severe Pain (7 - 10)  ketorolac   Injectable 15 milliGRAM(s) IV Push every 6 hours PRN Mild Pain (1 - 3)  ondansetron Injectable 4 milliGRAM(s) IV Push every 6 hours PRN Nausea    Allergies    bacitracin (Rash)    Intolerances      Vital Signs Last 24 Hrs  T(C): 36.8 (24 Nov 2022 09:28), Max: 37 (23 Nov 2022 21:57)  T(F): 98.2 (24 Nov 2022 09:28), Max: 98.6 (23 Nov 2022 21:57)  HR: 70 (24 Nov 2022 09:28) (66 - 94)  BP: 120/67 (24 Nov 2022 09:28) (118/69 - 149/76)  BP(mean): --  RR: 17 (24 Nov 2022 09:28) (14 - 25)  SpO2: 94% (24 Nov 2022 09:28) (93% - 96%)    Parameters below as of 24 Nov 2022 09:28  Patient On (Oxygen Delivery Method): room air      I&O's Summary    23 Nov 2022 07:01  -  24 Nov 2022 07:00  --------------------------------------------------------  IN: 150 mL / OUT: 650 mL / NET: -500 mL        TELE: SR 60   PHYSICAL EXAM:  Constitutional: NAD, awake and alert, well-developed  HEENT: Moist Mucous Membranes, Anicteric  Pulmonary: Non-labored, breath sounds are clear bilaterally, No wheezing, rales or rhonchi  Cardiovascular: Regular, S1 and S2, No murmurs, rubs, gallops or clicks  Gastrointestinal: Bowel Sounds present, soft, nontender.+ NGT LWS   Lymph: No peripheral edema. No lymphadenopathy.  Skin: No visible rashes or ulcers. surgical site intact   Psych:  Mood & affect appropriate  LABS: All Labs Reviewed:                        14.0   12.01 )-----------( 151      ( 24 Nov 2022 06:15 )             41.5                         15.2   14.61 )-----------( 175      ( 23 Nov 2022 12:00 )             45.2     24 Nov 2022 06:15    141    |  106    |  19     ----------------------------<  146    4.4     |  28     |  1.30   23 Nov 2022 12:00    138    |  105    |  22     ----------------------------<  114    4.3     |  27     |  1.40     Ca    8.7        24 Nov 2022 06:15  Ca    9.5        23 Nov 2022 12:00  Phos  2.7       24 Nov 2022 06:15  Mg     1.9       24 Nov 2022 06:15  Mg     2.0       23 Nov 2022 12:00    TPro  7.0    /  Alb  3.3    /  TBili  2.0    /  DBili  x      /  AST  14     /  ALT  20     /  AlkPhos  94     23 Nov 2022 12:00    PT/INR - ( 23 Nov 2022 18:00 )   PT: 16.6 sec;   INR: 1.41 ratio         PTT - ( 23 Nov 2022 18:00 )  PTT:31.1 sec      12 Lead ECG:   Ventricular Rate 64 BPM    Atrial Rate 64 BPM    P-R Interval 152 ms    QRS Duration 96 ms    Q-T Interval 398 ms    QTC Calculation(Bazett) 410 ms    P Axis 42 degrees    R Axis 37 degrees    T Axis 36 degrees    Diagnosis Line Sinus rhythm with marked sinus arrhythmia  Otherwise normal ECG  When compared with ECG of 23-NOV-2022 11:59, (Unconfirmed)  No significant change was found  Confirmed by kylie Ross ([id number]) on 11/24/2022 11:37:28 AM (11-24-22 @ 08:41)

## 2022-11-24 NOTE — PROGRESS NOTE ADULT - ASSESSMENT
Mr. Cantu is an 82 yoM with pmhx of cognitive decline, idiopathic rash, BPH, cardiac arrhythmia, jejunal diverticulum, and hypothyroidism who presents with three days of abdominal pain and distention being admitted for bowel perforation.    #Small bowel Perforation  #Arrythmia  #Hypothyroidism  #BPH    -Admit to Surgery  -Cardiology evaluation for history of arrythmia and recent loop recorder placement. EKG with sinus arrythmia, no ischemic changes noted  -Can continue metoprolol post-op  -Consider ID consult to help with antibiotic management post-op  -Continue synthroid, may need to transition to IV as patient likely will be NPO post procedure  -Patient requires emergent surgery- patient is intermediate risk for intermediate-high risk procedure and is medically optimized for emergent procedure  -Plan of care discussed with patient, wife, Surgery, Cardiology

## 2022-11-25 LAB
ALBUMIN SERPL ELPH-MCNC: 2.5 G/DL — LOW (ref 3.3–5)
ALP SERPL-CCNC: 69 U/L — SIGNIFICANT CHANGE UP (ref 40–120)
ALT FLD-CCNC: 22 U/L — SIGNIFICANT CHANGE UP (ref 12–78)
ANION GAP SERPL CALC-SCNC: 5 MMOL/L — SIGNIFICANT CHANGE UP (ref 5–17)
AST SERPL-CCNC: 19 U/L — SIGNIFICANT CHANGE UP (ref 15–37)
BILIRUB SERPL-MCNC: 1 MG/DL — SIGNIFICANT CHANGE UP (ref 0.2–1.2)
BUN SERPL-MCNC: 23 MG/DL — SIGNIFICANT CHANGE UP (ref 7–23)
CALCIUM SERPL-MCNC: 9.2 MG/DL — SIGNIFICANT CHANGE UP (ref 8.5–10.1)
CHLORIDE SERPL-SCNC: 108 MMOL/L — SIGNIFICANT CHANGE UP (ref 96–108)
CO2 SERPL-SCNC: 27 MMOL/L — SIGNIFICANT CHANGE UP (ref 22–31)
CREAT SERPL-MCNC: 1.3 MG/DL — SIGNIFICANT CHANGE UP (ref 0.5–1.3)
EGFR: 55 ML/MIN/1.73M2 — LOW
GLUCOSE SERPL-MCNC: 100 MG/DL — HIGH (ref 70–99)
HCT VFR BLD CALC: 41 % — SIGNIFICANT CHANGE UP (ref 39–50)
HGB BLD-MCNC: 13.7 G/DL — SIGNIFICANT CHANGE UP (ref 13–17)
MCHC RBC-ENTMCNC: 29.8 PG — SIGNIFICANT CHANGE UP (ref 27–34)
MCHC RBC-ENTMCNC: 33.4 GM/DL — SIGNIFICANT CHANGE UP (ref 32–36)
MCV RBC AUTO: 89.3 FL — SIGNIFICANT CHANGE UP (ref 80–100)
NRBC # BLD: 0 /100 WBCS — SIGNIFICANT CHANGE UP (ref 0–0)
PLATELET # BLD AUTO: 175 K/UL — SIGNIFICANT CHANGE UP (ref 150–400)
POTASSIUM SERPL-MCNC: 4 MMOL/L — SIGNIFICANT CHANGE UP (ref 3.5–5.3)
POTASSIUM SERPL-SCNC: 4 MMOL/L — SIGNIFICANT CHANGE UP (ref 3.5–5.3)
PROT SERPL-MCNC: 6.2 G/DL — SIGNIFICANT CHANGE UP (ref 6–8.3)
RBC # BLD: 4.59 M/UL — SIGNIFICANT CHANGE UP (ref 4.2–5.8)
RBC # FLD: 13 % — SIGNIFICANT CHANGE UP (ref 10.3–14.5)
SODIUM SERPL-SCNC: 140 MMOL/L — SIGNIFICANT CHANGE UP (ref 135–145)
WBC # BLD: 8.32 K/UL — SIGNIFICANT CHANGE UP (ref 3.8–10.5)
WBC # FLD AUTO: 8.32 K/UL — SIGNIFICANT CHANGE UP (ref 3.8–10.5)

## 2022-11-25 PROCEDURE — 99497 ADVNCD CARE PLAN 30 MIN: CPT

## 2022-11-25 PROCEDURE — 99232 SBSQ HOSP IP/OBS MODERATE 35: CPT

## 2022-11-25 RX ORDER — METOPROLOL TARTRATE 50 MG
25 TABLET ORAL DAILY
Refills: 0 | Status: DISCONTINUED | OUTPATIENT
Start: 2022-11-26 | End: 2022-11-28

## 2022-11-25 RX ADMIN — PANTOPRAZOLE SODIUM 40 MILLIGRAM(S): 20 TABLET, DELAYED RELEASE ORAL at 11:49

## 2022-11-25 RX ADMIN — PIPERACILLIN AND TAZOBACTAM 25 GRAM(S): 4; .5 INJECTION, POWDER, LYOPHILIZED, FOR SOLUTION INTRAVENOUS at 05:12

## 2022-11-25 RX ADMIN — TAMSULOSIN HYDROCHLORIDE 0.4 MILLIGRAM(S): 0.4 CAPSULE ORAL at 22:13

## 2022-11-25 RX ADMIN — PIPERACILLIN AND TAZOBACTAM 25 GRAM(S): 4; .5 INJECTION, POWDER, LYOPHILIZED, FOR SOLUTION INTRAVENOUS at 22:13

## 2022-11-25 RX ADMIN — Medication 50 MICROGRAM(S): at 23:17

## 2022-11-25 RX ADMIN — PIPERACILLIN AND TAZOBACTAM 25 GRAM(S): 4; .5 INJECTION, POWDER, LYOPHILIZED, FOR SOLUTION INTRAVENOUS at 13:03

## 2022-11-25 RX ADMIN — Medication 400 MILLIGRAM(S): at 08:30

## 2022-11-25 RX ADMIN — DONEPEZIL HYDROCHLORIDE 5 MILLIGRAM(S): 10 TABLET, FILM COATED ORAL at 22:13

## 2022-11-25 RX ADMIN — ENOXAPARIN SODIUM 40 MILLIGRAM(S): 100 INJECTION SUBCUTANEOUS at 13:03

## 2022-11-25 RX ADMIN — Medication 1000 MILLIGRAM(S): at 00:30

## 2022-11-25 RX ADMIN — Medication 1.25 MILLIGRAM(S): at 12:05

## 2022-11-25 RX ADMIN — Medication 1.25 MILLIGRAM(S): at 00:39

## 2022-11-25 RX ADMIN — Medication 1.25 MILLIGRAM(S): at 05:12

## 2022-11-25 RX ADMIN — SODIUM CHLORIDE 75 MILLILITER(S): 9 INJECTION, SOLUTION INTRAVENOUS at 08:37

## 2022-11-25 RX ADMIN — Medication 1000 MILLIGRAM(S): at 09:00

## 2022-11-25 RX ADMIN — CYCLOSPORINE 75 MILLIGRAM(S): 100 CAPSULE ORAL at 12:05

## 2022-11-25 RX ADMIN — Medication 1.25 MILLIGRAM(S): at 17:59

## 2022-11-25 NOTE — PROGRESS NOTE ADULT - SUBJECTIVE AND OBJECTIVE BOX
[facility]    Richa Castillo, Familia Brown Savella      [phone]    CHIEF COMPLAINT: Patient is a 82y old  Male who presents with a chief complaint of small bowel perforation (24 Nov 2022 11:54)      Follow Up:  pref bowel, npo, pacs    Interim history: The patient reports no new symptoms.  Denies chest discomfort and shortness of breath.  Persistent abdominal pain.  No new neurologic symptoms.      MEDICATIONS  (STANDING):  cycloSPORINE  , modified (GENGRAF) 75 milliGRAM(s) Oral daily  donepezil 5 milliGRAM(s) Oral at bedtime  enoxaparin Injectable 40 milliGRAM(s) SubCutaneous every 24 hours  lactated ringers. 1000 milliLiter(s) (75 mL/Hr) IV Continuous <Continuous>  levothyroxine Injectable 50 MICROGram(s) IV Push at bedtime  metoprolol tartrate Injectable 1.25 milliGRAM(s) IV Push every 6 hours  pantoprazole  Injectable 40 milliGRAM(s) IV Push daily  piperacillin/tazobactam IVPB.. 3.375 Gram(s) IV Intermittent every 8 hours  tamsulosin 0.4 milliGRAM(s) Oral at bedtime    MEDICATIONS  (PRN):  HYDROmorphone  Injectable 0.25 milliGRAM(s) IV Push every 4 hours PRN Moderate Pain (4 - 6)  HYDROmorphone  Injectable 0.5 milliGRAM(s) IV Push every 4 hours PRN Severe Pain (7 - 10)  ketorolac   Injectable 15 milliGRAM(s) IV Push every 6 hours PRN Mild Pain (1 - 3)  ondansetron Injectable 4 milliGRAM(s) IV Push every 6 hours PRN Nausea      REVIEW OF SYSTEMS:  eye, ent, GI, , allergic, dermatologic, musculoskeletal and neurologic are negative except as described above    Vital Signs Last 24 Hrs  T(C): 36.7 (25 Nov 2022 04:00), Max: 37.1 (24 Nov 2022 20:33)  T(F): 98 (25 Nov 2022 04:00), Max: 98.7 (24 Nov 2022 20:33)  HR: 67 (25 Nov 2022 04:00) (58 - 78)  BP: 158/73 (25 Nov 2022 04:00) (115/67 - 158/73)  BP(mean): --  RR: 19 (25 Nov 2022 04:00) (17 - 19)  SpO2: 92% (25 Nov 2022 04:00) (91% - 95%)    Parameters below as of 25 Nov 2022 04:00  Patient On (Oxygen Delivery Method): room air        I&O's Summary    24 Nov 2022 07:01  -  25 Nov 2022 07:00  --------------------------------------------------------  IN: 0 mL / OUT: 1780 mL / NET: -1780 mL        Telemetry past 24h: sr pacs    PHYSICAL EXAM:    Constitutional: well-nourished, well-developed, NAD   HEENT:  MMM, sclerae anicteric, conjunctivae clear, no oral cyanosis.  Pulmonary: Non-labored, breath sounds are clear bilaterally, No wheezing, rales or rhonchi  Cardiovascular: Regular, S1 and S2.  No murmur.  No rubs, gallops or clicks  Gastrointestinal: Bowel Sounds present, soft, dist with approp tenderness.   Lymph: No peripheral edema.   Neurological: Alert, no focal deficits  Skin: No rashes.  Psych:  Mood & affect appropriate    LABS: All Labs Reviewed:                        14.0   12.01 )-----------( 151      ( 24 Nov 2022 06:15 )             41.5                         15.2   14.61 )-----------( 175      ( 23 Nov 2022 12:00 )             45.2     24 Nov 2022 06:15    141    |  106    |  19     ----------------------------<  146    4.4     |  28     |  1.30   23 Nov 2022 12:00    138    |  105    |  22     ----------------------------<  114    4.3     |  27     |  1.40     Ca    8.7        24 Nov 2022 06:15  Ca    9.5        23 Nov 2022 12:00  Phos  2.7       24 Nov 2022 06:15  Mg     1.9       24 Nov 2022 06:15  Mg     2.0       23 Nov 2022 12:00    TPro  7.0    /  Alb  3.3    /  TBili  2.0    /  DBili  x      /  AST  14     /  ALT  20     /  AlkPhos  94     23 Nov 2022 12:00    PT/INR - ( 23 Nov 2022 18:00 )   PT: 16.6 sec;   INR: 1.41 ratio         PTT - ( 23 Nov 2022 18:00 )  PTT:31.1 sec      Blood Culture: Organism --  Gram Stain Blood -- Gram Stain --  Specimen Source .Blood Blood-Peripheral  Culture-Blood --    Organism --  Gram Stain Blood -- Gram Stain --  Specimen Source .Blood Blood-Peripheral  Culture-Blood --        11-23 @ 12:00  TSH: 3.21      RADIOLOGY:    EKG:    Echo:

## 2022-11-25 NOTE — PROGRESS NOTE ADULT - ASSESSMENT
83 y/o M with BPH, hypothyroidism with recent ILR for possible arrhythmia presented from an Urgent Care Ctr for abdominal distention, diffuse bloating, and pain.  He was found to have perforated bowel and is planned to have urgent surgery    Cardiac Optimization/Cardiac Arrhythmia  - s/p ILR implantation (2 wks ago) for palpitations.    - EKG showed sinus arrhythmia with no ischemia.    - no events on tele, SR w/ pacs overnight    - p/w abd pain and distention, + bowel perforation, s/p SBR    - tolerated well from CV POV   -remains with ngt and on ivf, mgmt per surgery    - BP labile  - Not on home BP med and no Hx of HTN.  Would not start treating now, can reconsider pending clinical course    - pain management per primary   - Monitor electrolytes, replete to keep K>4 and Mag>2    - Will continue to follow.

## 2022-11-25 NOTE — PROGRESS NOTE ADULT - ASSESSMENT
83 y/o M POD #2 s/p lap to open small bowel resection.   81 y/o M POD #2 s/p lap to open small bowel resection.  Surg. Att.  Pt. seen. He was sitting in a chair, Reading NYT and smiling.   NGT was removed, but will withold the diet until return of GI function  Abd still distended, wounds are clean.

## 2022-11-25 NOTE — PROGRESS NOTE ADULT - SUBJECTIVE AND OBJECTIVE BOX
Patient is a 82y old  Male who presents with a chief complaint of small bowel perforation (25 Nov 2022 08:30)      INTERVAL /OVERNIGHT EVENTS: off NGT    MEDICATIONS  (STANDING):  cycloSPORINE  , modified (GENGRAF) 75 milliGRAM(s) Oral daily  donepezil 5 milliGRAM(s) Oral at bedtime  enoxaparin Injectable 40 milliGRAM(s) SubCutaneous every 24 hours  lactated ringers. 1000 milliLiter(s) (75 mL/Hr) IV Continuous <Continuous>  levothyroxine Injectable 50 MICROGram(s) IV Push at bedtime  metoprolol tartrate Injectable 1.25 milliGRAM(s) IV Push every 6 hours  pantoprazole  Injectable 40 milliGRAM(s) IV Push daily  piperacillin/tazobactam IVPB.. 3.375 Gram(s) IV Intermittent every 8 hours  tamsulosin 0.4 milliGRAM(s) Oral at bedtime    MEDICATIONS  (PRN):  HYDROmorphone  Injectable 0.25 milliGRAM(s) IV Push every 4 hours PRN Moderate Pain (4 - 6)  HYDROmorphone  Injectable 0.5 milliGRAM(s) IV Push every 4 hours PRN Severe Pain (7 - 10)  ketorolac   Injectable 15 milliGRAM(s) IV Push every 6 hours PRN Mild Pain (1 - 3)  ondansetron Injectable 4 milliGRAM(s) IV Push every 6 hours PRN Nausea      Allergies    bacitracin (Rash)    Intolerances        REVIEW OF SYSTEMS:  CONSTITUTIONAL: No fever, weight loss, or fatigue  EYES: No eye pain, visual disturbances, or discharge  ENMT:  No difficulty hearing, tinnitus, vertigo; No sinus or throat pain  NECK: No pain or stiffness  RESPIRATORY: No cough, wheezing, chills or hemoptysis; No shortness of breath  CARDIOVASCULAR: No chest pain, palpitations, dizziness, or leg swelling  GASTROINTESTINAL: No abdominal or epigastric pain. No nausea, vomiting, or hematemesis; No diarrhea or constipation. No melena or hematochezia.  GENITOURINARY: No dysuria, frequency, hematuria, or incontinence  NEUROLOGICAL: No headaches, memory loss, loss of strength, numbness, or tremors  SKIN: No itching, burning, rashes, or lesions   LYMPH NODES: No enlarged glands  ENDOCRINE: No heat or cold intolerance; No hair loss; No polydipsia or polyuria  MUSCULOSKELETAL: No joint pain or swelling; No muscle, back, or extremity pain  PSYCHIATRIC: No depression, anxiety, mood swings, or difficulty sleeping  HEME/LYMPH: No easy bruising, or bleeding gums  ALLERGY AND IMMUNOLOGIC: No hives or eczema    Vital Signs Last 24 Hrs  T(C): 36.5 (25 Nov 2022 11:04), Max: 37.1 (24 Nov 2022 20:33)  T(F): 97.7 (25 Nov 2022 11:04), Max: 98.7 (24 Nov 2022 20:33)  HR: 72 (25 Nov 2022 12:48) (66 - 78)  BP: 144/66 (25 Nov 2022 12:48) (116/67 - 158/73)  BP(mean): --  RR: 20 (25 Nov 2022 11:04) (18 - 20)  SpO2: 95% (25 Nov 2022 11:04) (92% - 95%)    Parameters below as of 25 Nov 2022 04:00  Patient On (Oxygen Delivery Method): room air        PHYSICAL EXAM:  GENERAL: NAD, well-groomed, well-developed  HEAD:  Atraumatic, Normocephalic  EYES: EOMI, PERRLA, conjunctiva and sclera clear  ENMT: No tonsillar erythema, exudates, or enlargement; Moist mucous membranes, Good dentition, No lesions  NECK: Supple, No JVD, Normal thyroid  NERVOUS SYSTEM:  Alert & Oriented X3, Good concentration; Motor Strength 5/5 B/L upper and lower extremities; DTRs 2+ intact and symmetric  CHEST/LUNG: Clear to auscultation bilaterally; No rales, rhonchi, wheezing, or rubs  HEART: Regular rate and rhythm; No murmurs, rubs, or gallops  ABDOMEN: Soft, Nontender, Nondistended; Bowel sounds present  EXTREMITIES:  2+ Peripheral Pulses, No clubbing, cyanosis, or edema  LYMPH: No lymphadenopathy noted  SKIN: No rashes or lesions    LABS:                        13.7   8.32  )-----------( 175      ( 25 Nov 2022 10:00 )             41.0     25 Nov 2022 12:40    140    |  108    |  23     ----------------------------<  100    4.0     |  27     |  1.30     Ca    9.2        25 Nov 2022 12:40    TPro  6.2    /  Alb  2.5    /  TBili  1.0    /  DBili  x      /  AST  19     /  ALT  22     /  AlkPhos  69     25 Nov 2022 12:40    PT/INR - ( 23 Nov 2022 18:00 )   PT: 16.6 sec;   INR: 1.41 ratio         PTT - ( 23 Nov 2022 18:00 )  PTT:31.1 sec    CAPILLARY BLOOD GLUCOSE          RADIOLOGY & ADDITIONAL TESTS:    Notes Reviewed:  [x ] YES  [ ] NO    Care Discussed with Consultants/Other Providers [ x] YES  [ ] NO

## 2022-11-25 NOTE — PROGRESS NOTE ADULT - PROBLEM SELECTOR PLAN 1
- NPO, IVF  - Continue Zosyn, pain control as needed  - Encourage OOB/ambulation with assistance; incentive spirometry (pt was using incorrectly, educated on proper use)  - Remove li this AM as pt has been OOB & ambulating, TOV  - DVT Prophylaxis with lvx & SCDs  - Monitor & document NGT output  - Daily labs  - Above to be discussed with Dr. Chiu    Surgical Team  Spectralink: [id number] - NPO, IVF  - Continue Zosyn, pain control as needed  - Encourage OOB/ambulation with assistance; incentive spirometry (pt was using incorrectly, educated on proper use)  - Remove li this AM as pt has been OOB & ambulating, TOV  - DVT Prophylaxis with lvx & SCDs  - Monitor & document NGT output  - F/u AM labs  - Above to be discussed with Dr. Chiu    Surgical Team  Spectralink: [phone]

## 2022-11-25 NOTE — PROGRESS NOTE ADULT - SUBJECTIVE AND OBJECTIVE BOX
Pt seen and examined at bedside, denies any overnight events. Vital signs stable. Admits to moderate abd pain. Both pt and RN report pt being OOB & ambulating. NG tube w/ scant bilious drainage in tubing. 130 cc/past 24 hours documented output.  Denies nausea/vomiting, headache, chest pain, palpitations, shortness of breath, weakness or fatigue.    T(C): 36.7 (11-25-22 @ 04:00), Max: 37.1 (11-24-22 @ 20:33)  HR: 67 (11-25-22 @ 04:00) (58 - 78)  BP: 158/73 (11-25-22 @ 04:00) (115/67 - 158/73)  RR: 19 (11-25-22 @ 04:00) (17 - 19)  SpO2: 92% (11-25-22 @ 04:00) (91% - 95%)    PHYSICAL EXAM:  General: no acute distress, appears comfortable, li, NGT  HEENT: normocephalic, anicteric  Pulm: non labored respirations  Cardio: RRR  Abdomen: soft, mildly tender, distended, midline incisions w/ drsg - c/d/i; trochar site w/ dermabond  Extremities: no calf tenderness noted    I&O's Detail    24 Nov 2022 07:01  -  25 Nov 2022 07:00  --------------------------------------------------------  IN:  Total IN: 0 mL    OUT:    Indwelling Catheter - Urethral (mL): 1650 mL    Nasogastric/Oral tube (mL): 130 mL  Total OUT: 1780 mL    Total NET: -1780 mL    LABS:                        14.0   12.01 )-----------( 151      ( 24 Nov 2022 06:15 )             41.5     11-24    141  |  106  |  19  ----------------------------<  146<H>  4.4   |  28  |  1.30    Ca    8.7      24 Nov 2022 06:15  Phos  2.7     11-24  Mg     1.9     11-24    TPro  7.0  /  Alb  3.3  /  TBili  2.0<H>  /  DBili  x   /  AST  14<L>  /  ALT  20  /  AlkPhos  94  11-23    PT/INR - ( 23 Nov 2022 18:00 )   PT: 16.6 sec;   INR: 1.41 ratio    PTT - ( 23 Nov 2022 18:00 )  PTT:31.1 sec    Culture - Blood (collected 23 Nov 2022 16:10)  Source: .Blood Blood-Peripheral  Preliminary Report (25 Nov 2022 01:02):    No growth to date.    Culture - Blood (collected 23 Nov 2022 16:05)  Source: .Blood Blood-Peripheral  Preliminary Report (25 Nov 2022 01:02):    No growth to date.    MEDICATIONS:  acetaminophen   IVPB .. 1000 milliGRAM(s) IV Intermittent once  cycloSPORINE  , modified (GENGRAF) 75 milliGRAM(s) Oral daily  donepezil 5 milliGRAM(s) Oral at bedtime  enoxaparin Injectable 40 milliGRAM(s) SubCutaneous every 24 hours  HYDROmorphone  Injectable 0.25 milliGRAM(s) IV Push every 4 hours PRN  HYDROmorphone  Injectable 0.5 milliGRAM(s) IV Push every 4 hours PRN  ketorolac   Injectable 15 milliGRAM(s) IV Push every 6 hours PRN  lactated ringers. 1000 milliLiter(s) IV Continuous <Continuous>  levothyroxine Injectable 50 MICROGram(s) IV Push at bedtime  metoprolol tartrate Injectable 1.25 milliGRAM(s) IV Push every 6 hours  ondansetron Injectable 4 milliGRAM(s) IV Push every 6 hours PRN  pantoprazole  Injectable 40 milliGRAM(s) IV Push daily  piperacillin/tazobactam IVPB.. 3.375 Gram(s) IV Intermittent every 8 hours  tamsulosin 0.4 milliGRAM(s) Oral at bedtime       Pt seen and examined at bedside, denies any overnight events. Vital signs stable. Admits to moderate abd pain. Both pt and RN report pt being OOB & ambulating. NG tube w/ scant bilious drainage in tubing. 130 cc/past 24 hours documented output. Denies passing gas, (-) BM.  Denies nausea/vomiting, headache, chest pain, palpitations, shortness of breath, weakness or fatigue.    T(C): 36.7 (11-25-22 @ 04:00), Max: 37.1 (11-24-22 @ 20:33)  HR: 67 (11-25-22 @ 04:00) (58 - 78)  BP: 158/73 (11-25-22 @ 04:00) (115/67 - 158/73)  RR: 19 (11-25-22 @ 04:00) (17 - 19)  SpO2: 92% (11-25-22 @ 04:00) (91% - 95%)    PHYSICAL EXAM:  General: no acute distress, appears comfortable, li, NGT  HEENT: normocephalic, anicteric  Pulm: non labored respirations  Cardio: RRR  Abdomen: soft, mildly tender, distended, midline incisions w/ drsg - c/d/i; trochar site w/ dermabond  Extremities: no calf tenderness noted    I&O's Detail    24 Nov 2022 07:01  -  25 Nov 2022 07:00  --------------------------------------------------------  IN:  Total IN: 0 mL    OUT:    Indwelling Catheter - Urethral (mL): 1650 mL    Nasogastric/Oral tube (mL): 130 mL  Total OUT: 1780 mL    Total NET: -1780 mL    LABS:                        14.0   12.01 )-----------( 151      ( 24 Nov 2022 06:15 )             41.5     11-24    141  |  106  |  19  ----------------------------<  146<H>  4.4   |  28  |  1.30    Ca    8.7      24 Nov 2022 06:15  Phos  2.7     11-24  Mg     1.9     11-24    TPro  7.0  /  Alb  3.3  /  TBili  2.0<H>  /  DBili  x   /  AST  14<L>  /  ALT  20  /  AlkPhos  94  11-23    PT/INR - ( 23 Nov 2022 18:00 )   PT: 16.6 sec;   INR: 1.41 ratio    PTT - ( 23 Nov 2022 18:00 )  PTT:31.1 sec    Culture - Blood (collected 23 Nov 2022 16:10)  Source: .Blood Blood-Peripheral  Preliminary Report (25 Nov 2022 01:02):    No growth to date.    Culture - Blood (collected 23 Nov 2022 16:05)  Source: .Blood Blood-Peripheral  Preliminary Report (25 Nov 2022 01:02):    No growth to date.    MEDICATIONS:  acetaminophen   IVPB .. 1000 milliGRAM(s) IV Intermittent once  cycloSPORINE  , modified (GENGRAF) 75 milliGRAM(s) Oral daily  donepezil 5 milliGRAM(s) Oral at bedtime  enoxaparin Injectable 40 milliGRAM(s) SubCutaneous every 24 hours  HYDROmorphone  Injectable 0.25 milliGRAM(s) IV Push every 4 hours PRN  HYDROmorphone  Injectable 0.5 milliGRAM(s) IV Push every 4 hours PRN  ketorolac   Injectable 15 milliGRAM(s) IV Push every 6 hours PRN  lactated ringers. 1000 milliLiter(s) IV Continuous <Continuous>  levothyroxine Injectable 50 MICROGram(s) IV Push at bedtime  metoprolol tartrate Injectable 1.25 milliGRAM(s) IV Push every 6 hours  ondansetron Injectable 4 milliGRAM(s) IV Push every 6 hours PRN  pantoprazole  Injectable 40 milliGRAM(s) IV Push daily  piperacillin/tazobactam IVPB.. 3.375 Gram(s) IV Intermittent every 8 hours  tamsulosin 0.4 milliGRAM(s) Oral at bedtime

## 2022-11-25 NOTE — PHYSICAL THERAPY INITIAL EVALUATION ADULT - PERTINENT HX OF CURRENT PROBLEM, REHAB EVAL
Mr. Cantu is an 82 yoM with pmhx of cognitive decline, idiopathic rash, BPH, cardiac arrhythmia, jejunal diverticulum, and hypothyroidism who presents with three days of abdominal pain and distention. Pt woke up three days ago with distention and pain. Symptoms have not improved and so he presented to urgent care this morning. Additionally pt has not had a bowel movement since his symptoms started. Pt regularly has a soft bowel movement daily. At the urgent care patient had an EKG was referred to go to the emergency room. Denies f/c/n/v/sob/chest pain, difficulty with eating, or blood in stool. Patient had similar symptoms on Father's day 6/19/22 and presented to the ED at [facility] and was diagnosed with jejunal diverticulum on CT scan. Of note, pt had loop recorder placed ~2 weeks however it has not been able to be interrogated due to synching difficulties.

## 2022-11-25 NOTE — GOALS OF CARE CONVERSATION - ADVANCED CARE PLANNING - CONVERSATION DETAILS
Writer met with pt. and wife at bedside. Reviewed patient's medical and social history as well as events leading to patient's hospitalization. Writer discussed patient's current diagnosis (sm. bowel perf., s/p ex lap, jejunum diverticulum, cardiac arry. yet to be diagnosed, cognitive decline ,BPH.), medical condition and management, prognosis. Inquired about patient's wishes regarding extent of medical care to be provided including escalation of medical care into the ICU and use of vasopressor support. In addition, the writer inquired about thoughts regarding cardiopulmonary resuscitation, artificial nutrition and hydration including use of feeding tubes and IVF, antibiotics, and further investigative studies such as blood draws and radiology. Pt and wife  showed good insight into medical condition. All questions answered. Writer recommended pt. decide and inform his wife of his wishes. Ainsley knows he doesn't want extraordinary measures but wants  to leave decision to him. Wife states cognitive issues are minimal. Psychosocial support provided.

## 2022-11-26 LAB
ALBUMIN SERPL ELPH-MCNC: 2.6 G/DL — LOW (ref 3.3–5)
ALP SERPL-CCNC: 71 U/L — SIGNIFICANT CHANGE UP (ref 40–120)
ALT FLD-CCNC: 18 U/L — SIGNIFICANT CHANGE UP (ref 12–78)
ANION GAP SERPL CALC-SCNC: 7 MMOL/L — SIGNIFICANT CHANGE UP (ref 5–17)
AST SERPL-CCNC: 16 U/L — SIGNIFICANT CHANGE UP (ref 15–37)
BILIRUB SERPL-MCNC: 1.1 MG/DL — SIGNIFICANT CHANGE UP (ref 0.2–1.2)
BUN SERPL-MCNC: 23 MG/DL — SIGNIFICANT CHANGE UP (ref 7–23)
CALCIUM SERPL-MCNC: 9.7 MG/DL — SIGNIFICANT CHANGE UP (ref 8.5–10.1)
CHLORIDE SERPL-SCNC: 107 MMOL/L — SIGNIFICANT CHANGE UP (ref 96–108)
CO2 SERPL-SCNC: 27 MMOL/L — SIGNIFICANT CHANGE UP (ref 22–31)
CREAT SERPL-MCNC: 1.2 MG/DL — SIGNIFICANT CHANGE UP (ref 0.5–1.3)
EGFR: 61 ML/MIN/1.73M2 — SIGNIFICANT CHANGE UP
GLUCOSE SERPL-MCNC: 94 MG/DL — SIGNIFICANT CHANGE UP (ref 70–99)
HCT VFR BLD CALC: 42.1 % — SIGNIFICANT CHANGE UP (ref 39–50)
HGB BLD-MCNC: 14.2 G/DL — SIGNIFICANT CHANGE UP (ref 13–17)
MAGNESIUM SERPL-MCNC: 1.9 MG/DL — SIGNIFICANT CHANGE UP (ref 1.6–2.6)
MCHC RBC-ENTMCNC: 30.4 PG — SIGNIFICANT CHANGE UP (ref 27–34)
MCHC RBC-ENTMCNC: 33.7 GM/DL — SIGNIFICANT CHANGE UP (ref 32–36)
MCV RBC AUTO: 90.1 FL — SIGNIFICANT CHANGE UP (ref 80–100)
NRBC # BLD: 0 /100 WBCS — SIGNIFICANT CHANGE UP (ref 0–0)
PHOSPHATE SERPL-MCNC: 1.7 MG/DL — LOW (ref 2.5–4.5)
PLATELET # BLD AUTO: 203 K/UL — SIGNIFICANT CHANGE UP (ref 150–400)
POTASSIUM SERPL-MCNC: 4.2 MMOL/L — SIGNIFICANT CHANGE UP (ref 3.5–5.3)
POTASSIUM SERPL-SCNC: 4.2 MMOL/L — SIGNIFICANT CHANGE UP (ref 3.5–5.3)
PROT SERPL-MCNC: 6.6 G/DL — SIGNIFICANT CHANGE UP (ref 6–8.3)
RBC # BLD: 4.67 M/UL — SIGNIFICANT CHANGE UP (ref 4.2–5.8)
RBC # FLD: 12.9 % — SIGNIFICANT CHANGE UP (ref 10.3–14.5)
SODIUM SERPL-SCNC: 141 MMOL/L — SIGNIFICANT CHANGE UP (ref 135–145)
WBC # BLD: 8.16 K/UL — SIGNIFICANT CHANGE UP (ref 3.8–10.5)
WBC # FLD AUTO: 8.16 K/UL — SIGNIFICANT CHANGE UP (ref 3.8–10.5)

## 2022-11-26 PROCEDURE — 99232 SBSQ HOSP IP/OBS MODERATE 35: CPT

## 2022-11-26 RX ORDER — SODIUM CHLORIDE 9 MG/ML
1000 INJECTION, SOLUTION INTRAVENOUS
Refills: 0 | Status: DISCONTINUED | OUTPATIENT
Start: 2022-11-26 | End: 2022-11-28

## 2022-11-26 RX ORDER — SODIUM CHLORIDE 9 MG/ML
1000 INJECTION INTRAMUSCULAR; INTRAVENOUS; SUBCUTANEOUS
Refills: 0 | Status: DISCONTINUED | OUTPATIENT
Start: 2022-11-26 | End: 2022-11-26

## 2022-11-26 RX ORDER — AMLODIPINE BESYLATE 2.5 MG/1
2.5 TABLET ORAL EVERY 24 HOURS
Refills: 0 | Status: DISCONTINUED | OUTPATIENT
Start: 2022-11-26 | End: 2022-11-27

## 2022-11-26 RX ORDER — SENNA PLUS 8.6 MG/1
2 TABLET ORAL AT BEDTIME
Refills: 0 | Status: DISCONTINUED | OUTPATIENT
Start: 2022-11-26 | End: 2022-12-01

## 2022-11-26 RX ADMIN — PANTOPRAZOLE SODIUM 40 MILLIGRAM(S): 20 TABLET, DELAYED RELEASE ORAL at 11:56

## 2022-11-26 RX ADMIN — CYCLOSPORINE 75 MILLIGRAM(S): 100 CAPSULE ORAL at 13:10

## 2022-11-26 RX ADMIN — AMLODIPINE BESYLATE 2.5 MILLIGRAM(S): 2.5 TABLET ORAL at 21:09

## 2022-11-26 RX ADMIN — Medication 1.25 MILLIGRAM(S): at 00:51

## 2022-11-26 RX ADMIN — PIPERACILLIN AND TAZOBACTAM 25 GRAM(S): 4; .5 INJECTION, POWDER, LYOPHILIZED, FOR SOLUTION INTRAVENOUS at 06:13

## 2022-11-26 RX ADMIN — SODIUM CHLORIDE 75 MILLILITER(S): 9 INJECTION INTRAMUSCULAR; INTRAVENOUS; SUBCUTANEOUS at 07:13

## 2022-11-26 RX ADMIN — Medication 25 MILLIGRAM(S): at 06:13

## 2022-11-26 RX ADMIN — ENOXAPARIN SODIUM 40 MILLIGRAM(S): 100 INJECTION SUBCUTANEOUS at 13:04

## 2022-11-26 RX ADMIN — SODIUM CHLORIDE 75 MILLILITER(S): 9 INJECTION, SOLUTION INTRAVENOUS at 02:43

## 2022-11-26 RX ADMIN — SENNA PLUS 2 TABLET(S): 8.6 TABLET ORAL at 21:08

## 2022-11-26 RX ADMIN — Medication 50 MICROGRAM(S): at 21:08

## 2022-11-26 RX ADMIN — TAMSULOSIN HYDROCHLORIDE 0.4 MILLIGRAM(S): 0.4 CAPSULE ORAL at 21:08

## 2022-11-26 RX ADMIN — DONEPEZIL HYDROCHLORIDE 5 MILLIGRAM(S): 10 TABLET, FILM COATED ORAL at 21:08

## 2022-11-26 RX ADMIN — PIPERACILLIN AND TAZOBACTAM 25 GRAM(S): 4; .5 INJECTION, POWDER, LYOPHILIZED, FOR SOLUTION INTRAVENOUS at 21:07

## 2022-11-26 RX ADMIN — PIPERACILLIN AND TAZOBACTAM 25 GRAM(S): 4; .5 INJECTION, POWDER, LYOPHILIZED, FOR SOLUTION INTRAVENOUS at 13:09

## 2022-11-26 NOTE — PROGRESS NOTE ADULT - ASSESSMENT
83 y/o M with BPH, hypothyroidism with recent ILR for possible arrhythmia presented from an [facility] for abdominal distention, diffuse bloating, and pain.  He was found to have perforated bowel and is s/p lap to open small bowel resection    Cardiac Optimization/Cardiac Arrhythmia  - p/w abd pain and distention, + bowel perforation, s/p SBR    - tolerated well from CV POV     - s/p ILR implantation (2 wks ago) for palpitations.    - EKG showed sinus arrhythmia with no ischemia.    - Tele with SR PACS, no events, can d/c tele     - BP elevated 150-170s, resumed Metoprolol and Norvasc     - Pain management per primary   - Monitor and replete lytes, keep K>4, Mg>2.  - Will continue to follow.    Jenna López MS FNP, [facility]  Nurse Practitioner- Cardiology   Spectra #[phone]

## 2022-11-26 NOTE — PROGRESS NOTE ADULT - SUBJECTIVE AND OBJECTIVE BOX
[facility] -- Richa Castillo, Familia Brown Savella, [place]: Office # [phone]    Follow Up:  Cardiac clearance     Subjective/Observations: Patient seen and examined. Patient awake, alert, resting in bed. No complaints of chest pain, dyspnea, palpitations or dizziness. No signs of orthopnea or PND.     REVIEW OF SYSTEMS: All other review of systems are negative unless indicated above    PAST MEDICAL & SURGICAL HISTORY:  Benign non-nodular prostatic hyperplasia, presence of lower urinary tract symptoms unspecified      Memory impairment      Hypothyroid      Cardiac dysrhythmia      H/O right inguinal hernia repair  1995      S/P appendectomy      S/P right inguinal hernia repair  2016, Dr. Parson          MEDICATIONS  (STANDING):  amLODIPine   Tablet 2.5 milliGRAM(s) Oral every 24 hours  cycloSPORINE  , modified (GENGRAF) 75 milliGRAM(s) Oral daily  donepezil 5 milliGRAM(s) Oral at bedtime  enoxaparin Injectable 40 milliGRAM(s) SubCutaneous every 24 hours  levothyroxine Injectable 50 MICROGram(s) IV Push at bedtime  metoprolol tartrate 25 milliGRAM(s) Oral daily  pantoprazole  Injectable 40 milliGRAM(s) IV Push daily  piperacillin/tazobactam IVPB.. 3.375 Gram(s) IV Intermittent every 8 hours  sodium chloride 0.45%. 1000 milliLiter(s) (100 mL/Hr) IV Continuous <Continuous>  tamsulosin 0.4 milliGRAM(s) Oral at bedtime    MEDICATIONS  (PRN):  HYDROmorphone  Injectable 0.25 milliGRAM(s) IV Push every 4 hours PRN Moderate Pain (4 - 6)  HYDROmorphone  Injectable 0.5 milliGRAM(s) IV Push every 4 hours PRN Severe Pain (7 - 10)  ketorolac   Injectable 15 milliGRAM(s) IV Push every 6 hours PRN Mild Pain (1 - 3)  ondansetron Injectable 4 milliGRAM(s) IV Push every 6 hours PRN Nausea    Allergies    bacitracin (Rash)    Intolerances      Vital Signs Last 24 Hrs  T(C): 36.3 (26 Nov 2022 12:27), Max: 36.9 (26 Nov 2022 04:02)  T(F): 97.4 (26 Nov 2022 12:27), Max: 98.5 (26 Nov 2022 04:02)  HR: 66 (26 Nov 2022 12:27) (60 - 74)  BP: 156/64 (26 Nov 2022 12:27) (155/78 - 174/76)  BP(mean): --  RR: 18 (26 Nov 2022 12:27) (17 - 18)  SpO2: 93% (26 Nov 2022 12:27) (90% - 93%)    Parameters below as of 26 Nov 2022 12:27  Patient On (Oxygen Delivery Method): room air      I&O's Summary    25 Nov 2022 07:01  -  26 Nov 2022 07:00  --------------------------------------------------------  IN: 1420 mL / OUT: 1650 mL / NET: -230 mL          TELE:  PACs, nonsustained 90s   PHYSICAL EXAM:  Constitutional: NAD, awake and alert, Well developed   HEENT: Moist Mucous Membranes, Anicteric  Pulmonary: Non-labored, breath sounds are clear bilaterally, No wheezing, rales or rhonchi  Cardiovascular: Regular, S1 and S2, No murmurs, No rubs, gallops or clicks  Gastrointestinal:  soft, nontender, nondistended   Lymph: No peripheral edema. No lymphadenopathy.   Skin: No visible rashes or ulcers.  Psych:  Mood & affect appropriate      LABS: All Labs Reviewed:                        14.2   8.16  )-----------( 203      ( 26 Nov 2022 07:00 )             42.1                         13.7   8.32  )-----------( 175      ( 25 Nov 2022 10:00 )             41.0                         14.0   12.01 )-----------( 151      ( 24 Nov 2022 06:15 )             41.5     26 Nov 2022 07:00    141    |  107    |  23     ----------------------------<  94     4.2     |  27     |  1.20   25 Nov 2022 12:40    140    |  108    |  23     ----------------------------<  100    4.0     |  27     |  1.30   24 Nov 2022 06:15    141    |  106    |  19     ----------------------------<  146    4.4     |  28     |  1.30     Ca    9.7        26 Nov 2022 07:00  Ca    9.2        25 Nov 2022 12:40  Ca    8.7        24 Nov 2022 06:15  Phos  1.7       26 Nov 2022 07:00  Phos  2.7       24 Nov 2022 06:15  Mg     1.9       26 Nov 2022 07:00  Mg     1.9       24 Nov 2022 06:15    TPro  6.6    /  Alb  2.6    /  TBili  1.1    /  DBili  x      /  AST  16     /  ALT  18     /  AlkPhos  71     26 Nov 2022 07:00  TPro  6.2    /  Alb  2.5    /  TBili  1.0    /  DBili  x      /  AST  19     /  ALT  22     /  AlkPhos  69     25 Nov 2022 12:40   LIVER FUNCTIONS - ( 26 Nov 2022 07:00 )  Alb: 2.6 g/dL / Pro: 6.6 g/dL / ALK PHOS: 71 U/L / ALT: 18 U/L / AST: 16 U/L / GGT: x           Troponin I, High Sensitivity Result: 7.1 ng/L (11-23-22 @ 12:00)  Lactate, Blood: 0.6 mmol/L (11-23-22 @ 16:10)    12 Lead ECG:   Ventricular Rate 64 BPM    Atrial Rate 64 BPM    P-R Interval 152 ms    QRS Duration 96 ms    Q-T Interval 398 ms    QTC Calculation(Bazett) 410 ms    P Axis 42 degrees    R Axis 37 degrees    T Axis 36 degrees    Diagnosis Line Sinus rhythm with marked sinus arrhythmia  Otherwise normal ECG  When compared with ECG of 23-NOV-2022 11:59, (Unconfirmed)  No significant change was found  Confirmed by kylie Ross ([id number]) on 11/24/2022 11:37:28 AM (11-24-22 @ 08:41)

## 2022-11-26 NOTE — PROGRESS NOTE ADULT - SUBJECTIVE AND OBJECTIVE BOX
Patient is a 82y old  Male who presents with a chief complaint of small bowel perforation (26 Nov 2022 13:07)      INTERVAL /OVERNIGHT EVENTS: no flatus yet    MEDICATIONS  (STANDING):  amLODIPine   Tablet 2.5 milliGRAM(s) Oral every 24 hours  cycloSPORINE  , modified (GENGRAF) 75 milliGRAM(s) Oral daily  donepezil 5 milliGRAM(s) Oral at bedtime  enoxaparin Injectable 40 milliGRAM(s) SubCutaneous every 24 hours  levothyroxine Injectable 50 MICROGram(s) IV Push at bedtime  metoprolol tartrate 25 milliGRAM(s) Oral daily  pantoprazole  Injectable 40 milliGRAM(s) IV Push daily  piperacillin/tazobactam IVPB.. 3.375 Gram(s) IV Intermittent every 8 hours  sodium chloride 0.45%. 1000 milliLiter(s) (100 mL/Hr) IV Continuous <Continuous>  tamsulosin 0.4 milliGRAM(s) Oral at bedtime    MEDICATIONS  (PRN):  HYDROmorphone  Injectable 0.25 milliGRAM(s) IV Push every 4 hours PRN Moderate Pain (4 - 6)  HYDROmorphone  Injectable 0.5 milliGRAM(s) IV Push every 4 hours PRN Severe Pain (7 - 10)  ketorolac   Injectable 15 milliGRAM(s) IV Push every 6 hours PRN Mild Pain (1 - 3)  ondansetron Injectable 4 milliGRAM(s) IV Push every 6 hours PRN Nausea      Allergies    bacitracin (Rash)    Intolerances        REVIEW OF SYSTEMS:  CONSTITUTIONAL: No fever, weight loss, or fatigue  EYES: No eye pain, visual disturbances, or discharge  ENMT:  No difficulty hearing, tinnitus, vertigo; No sinus or throat pain  NECK: No pain or stiffness  RESPIRATORY: No cough, wheezing, chills or hemoptysis; No shortness of breath  CARDIOVASCULAR: No chest pain, palpitations, dizziness, or leg swelling  GASTROINTESTINAL: No abdominal or epigastric pain. No nausea, vomiting, or hematemesis; No diarrhea or constipation. No melena or hematochezia.  GENITOURINARY: No dysuria, frequency, hematuria, or incontinence  NEUROLOGICAL: No headaches, memory loss, loss of strength, numbness, or tremors  SKIN: No itching, burning, rashes, or lesions   LYMPH NODES: No enlarged glands  ENDOCRINE: No heat or cold intolerance; No hair loss; No polydipsia or polyuria  MUSCULOSKELETAL: No joint pain or swelling; No muscle, back, or extremity pain  PSYCHIATRIC: No depression, anxiety, mood swings, or difficulty sleeping  HEME/LYMPH: No easy bruising, or bleeding gums  ALLERGY AND IMMUNOLOGIC: No hives or eczema    Vital Signs Last 24 Hrs  T(C): 36.3 (26 Nov 2022 12:27), Max: 36.9 (26 Nov 2022 04:02)  T(F): 97.4 (26 Nov 2022 12:27), Max: 98.5 (26 Nov 2022 04:02)  HR: 66 (26 Nov 2022 12:27) (60 - 74)  BP: 156/64 (26 Nov 2022 12:27) (155/78 - 174/76)  BP(mean): --  RR: 18 (26 Nov 2022 12:27) (17 - 18)  SpO2: 93% (26 Nov 2022 12:27) (90% - 93%)    Parameters below as of 26 Nov 2022 12:27  Patient On (Oxygen Delivery Method): room air        PHYSICAL EXAM:  GENERAL: NAD, well-groomed, well-developed  HEAD:  Atraumatic, Normocephalic  EYES: EOMI, PERRLA, conjunctiva and sclera clear  ENMT: No tonsillar erythema, exudates, or enlargement; Moist mucous membranes, Good dentition, No lesions  NECK: Supple, No JVD, Normal thyroid  NERVOUS SYSTEM:  Alert & Oriented X3, Good concentration; Motor Strength 5/5 B/L upper and lower extremities; DTRs 2+ intact and symmetric  CHEST/LUNG: Clear to auscultation bilaterally; No rales, rhonchi, wheezing, or rubs  HEART: Regular rate and rhythm; No murmurs, rubs, or gallops  ABDOMEN: Soft, Nontender, Nondistended; Bowel sounds present  EXTREMITIES:  2+ Peripheral Pulses, No clubbing, cyanosis, or edema  LYMPH: No lymphadenopathy noted  SKIN: No rashes or lesions    LABS:                        14.2   8.16  )-----------( 203      ( 26 Nov 2022 07:00 )             42.1     26 Nov 2022 07:00    141    |  107    |  23     ----------------------------<  94     4.2     |  27     |  1.20     Ca    9.7        26 Nov 2022 07:00  Phos  1.7       26 Nov 2022 07:00  Mg     1.9       26 Nov 2022 07:00    TPro  6.6    /  Alb  2.6    /  TBili  1.1    /  DBili  x      /  AST  16     /  ALT  18     /  AlkPhos  71     26 Nov 2022 07:00        CAPILLARY BLOOD GLUCOSE          RADIOLOGY & ADDITIONAL TESTS:    Notes Reviewed:  [x ] YES  [ ] NO    Care Discussed with Consultants/Other Providers [x ] YES  [ ] NO

## 2022-11-26 NOTE — DIETITIAN INITIAL EVALUATION ADULT - ADD RECOMMEND
1. advance to clear liquids per MD discretion 2. recommend MVI 3. monitor Phos levels recommend supplement as appropriate

## 2022-11-26 NOTE — DIETITIAN NUTRITION RISK NOTIFICATION - TREATMENT: THE FOLLOWING DIET HAS BEEN RECOMMENDED
Diet, NPO:   Except Medications     Special Instructions for Nursing:  Except Medications (11-23-22 @ 22:17) [Active]

## 2022-11-26 NOTE — DIETITIAN INITIAL EVALUATION ADULT - PERTINENT LABORATORY DATA
11-26    141  |  107  |  23  ----------------------------<  94  4.2   |  27  |  1.20    Ca    9.7      26 Nov 2022 07:00  Phos  1.7     11-26  Mg     1.9     11-26    TPro  x   /  Alb  2.6<L>  /  TBili  x   /  DBili  x   /  AST  16  /  ALT  18  /  AlkPhos  x   11-26  A1C with Estimated Average Glucose Result: 5.3 % (11-24-22 @ 13:00)

## 2022-11-26 NOTE — PROGRESS NOTE ADULT - ASSESSMENT
81 y/o M POD #3 s/p lap to open small bowel resection, NGT removed 11/25.   81 y/o M POD #3 s/p lap to open small bowel resection, NGT removed 11/25.        As in above PA notation.  Mr. Cantu personally seen and examined during early rounds.  In good spirits.  Good pain control.  NG out.  No GI complaints.  Still no flatus...  Vitals non-suggestive.  Wounds clean and abdomen soft with appropriate incisional tenderness.  Labs reassuring from today.  Clinically, improving overall.  Surgically, stable for present.  To continue current supportive care.  Will start clear liquids with initiation of flatus.  Reviewed with patient in detail, family at bedside and Surgical PA team.

## 2022-11-26 NOTE — PROGRESS NOTE ADULT - PROBLEM SELECTOR PLAN 1
- NGT removed; monitor for GI function prior to advancing diet  - Encouraged OOB/IS use/Ambulation  - Monitor I&Os  - F/u am labs  - Will discuss w/ Dr. Barnett (covering for Dr. Chiu)

## 2022-11-26 NOTE — DIETITIAN INITIAL EVALUATION ADULT - OTHER INFO
History of Present Illness:  Reason for Admission: small bowel perforation  History of Present Illness:   Mr. Cantu is an 82 yoM with pmhx of cognitive decline, idiopathic rash, BPH, cardiac arrhythmia, jejunal diverticulum, and hypothyroidism who presents with three days of abdominal pain and distention. Pt woke up three days ago with distention and pain. Symptoms have not improved and so he presented to urgent care this morning. Additionally pt has not had a bowel movement since his symptoms started. Pt regularly has a soft bowel movement daily  admit wt 147# stated wt 145# usual wt 150#  ? last BM   POD #3 small bowel resection lap to open  Phos 1.7

## 2022-11-26 NOTE — PROGRESS NOTE ADULT - SUBJECTIVE AND OBJECTIVE BOX
SURGERY PA NOTE ON BEHALF OF DR. BONILLA:    S: Patient seen and examined at bedside.   No acute events overnight.  Patient reports up ambulating, voiding.  No evidence of GI function yet.  Denies fevers, chills, chest pain, SOB, palpitations, calf pain.      MEDICATIONS:  cycloSPORINE  , modified (GENGRAF) 75 milliGRAM(s) Oral daily  donepezil 5 milliGRAM(s) Oral at bedtime  enoxaparin Injectable 40 milliGRAM(s) SubCutaneous every 24 hours  HYDROmorphone  Injectable 0.25 milliGRAM(s) IV Push every 4 hours PRN  HYDROmorphone  Injectable 0.5 milliGRAM(s) IV Push every 4 hours PRN  ketorolac   Injectable 15 milliGRAM(s) IV Push every 6 hours PRN  levothyroxine Injectable 50 MICROGram(s) IV Push at bedtime  metoprolol tartrate 25 milliGRAM(s) Oral daily  metoprolol tartrate Injectable 1.25 milliGRAM(s) IV Push every 6 hours  ondansetron Injectable 4 milliGRAM(s) IV Push every 6 hours PRN  pantoprazole  Injectable 40 milliGRAM(s) IV Push daily  piperacillin/tazobactam IVPB.. 3.375 Gram(s) IV Intermittent every 8 hours  sodium chloride 0.9%. 1000 milliLiter(s) IV Continuous <Continuous>  tamsulosin 0.4 milliGRAM(s) Oral at bedtime      O:  Vital Signs Last 24 Hrs  T(C): 36.9 (26 Nov 2022 04:02), Max: 36.9 (26 Nov 2022 04:02)  T(F): 98.5 (26 Nov 2022 04:02), Max: 98.5 (26 Nov 2022 04:02)  HR: 62 (26 Nov 2022 04:02) (62 - 74)  BP: 155/78 (26 Nov 2022 04:02) (144/66 - 174/76)  BP(mean): --  RR: 18 (26 Nov 2022 04:02) (17 - 20)  SpO2: 92% (26 Nov 2022 04:02) (90% - 95%)    Parameters below as of 26 Nov 2022 04:02  Patient On (Oxygen Delivery Method): room air        I&O SUMMARY:    11-25-22 @ 07:01 - 11-26-22 @ 07:00  --------------------------------------------------------  IN: 1420 mL / OUT: 1650 mL / NET: -230 mL        PHYSICAL EXAM:  Lungs: CTA bilat without W/R/R  Card: S1S2  Abd: Soft, NT, ND.  +BS x 4.  No rebound/guarding.  Abdominal incisions c/d/i.    Ext: Calves soft, NT, without edema bilat    LABS:                        14.2   8.16  )-----------( 203      ( 26 Nov 2022 07:00 )             42.1     11-26    x   |  x   |  23  ----------------------------<  94  x    |  27  |  1.20    Ca    9.7      26 Nov 2022 07:00  Mg     1.9     11-26    TPro  x   /  Alb  2.6<L>  /  TBili  x   /  DBili  x   /  AST  16  /  ALT  18  /  AlkPhos  x   11-26

## 2022-11-26 NOTE — DIETITIAN INITIAL EVALUATION ADULT - PERTINENT MEDS FT
MEDICATIONS  (STANDING):  cycloSPORINE  , modified (GENGRAF) 75 milliGRAM(s) Oral daily  donepezil 5 milliGRAM(s) Oral at bedtime  enoxaparin Injectable 40 milliGRAM(s) SubCutaneous every 24 hours  levothyroxine Injectable 50 MICROGram(s) IV Push at bedtime  metoprolol tartrate 25 milliGRAM(s) Oral daily  metoprolol tartrate Injectable 1.25 milliGRAM(s) IV Push every 6 hours  pantoprazole  Injectable 40 milliGRAM(s) IV Push daily  piperacillin/tazobactam IVPB.. 3.375 Gram(s) IV Intermittent every 8 hours  sodium chloride 0.9%. 1000 milliLiter(s) (75 mL/Hr) IV Continuous <Continuous>  tamsulosin 0.4 milliGRAM(s) Oral at bedtime    MEDICATIONS  (PRN):  HYDROmorphone  Injectable 0.25 milliGRAM(s) IV Push every 4 hours PRN Moderate Pain (4 - 6)  HYDROmorphone  Injectable 0.5 milliGRAM(s) IV Push every 4 hours PRN Severe Pain (7 - 10)  ketorolac   Injectable 15 milliGRAM(s) IV Push every 6 hours PRN Mild Pain (1 - 3)  ondansetron Injectable 4 milliGRAM(s) IV Push every 6 hours PRN Nausea

## 2022-11-26 NOTE — DIETITIAN INITIAL EVALUATION ADULT - ORAL INTAKE PTA/DIET HISTORY
patient seen with family in room   reports with fairly good PO PTA   follow regular diet PTA up until few days PTA when not feeling well  no food allergies  education not appropriate at this time patient NPO  usual weight 145# PTA

## 2022-11-27 LAB
ALBUMIN SERPL ELPH-MCNC: 2.2 G/DL — LOW (ref 3.3–5)
ALP SERPL-CCNC: 64 U/L — SIGNIFICANT CHANGE UP (ref 40–120)
ALT FLD-CCNC: 22 U/L — SIGNIFICANT CHANGE UP (ref 12–78)
ANION GAP SERPL CALC-SCNC: 11 MMOL/L — SIGNIFICANT CHANGE UP (ref 5–17)
AST SERPL-CCNC: 18 U/L — SIGNIFICANT CHANGE UP (ref 15–37)
BILIRUB SERPL-MCNC: 0.9 MG/DL — SIGNIFICANT CHANGE UP (ref 0.2–1.2)
BUN SERPL-MCNC: 21 MG/DL — SIGNIFICANT CHANGE UP (ref 7–23)
CALCIUM SERPL-MCNC: 9.4 MG/DL — SIGNIFICANT CHANGE UP (ref 8.5–10.1)
CHLORIDE SERPL-SCNC: 108 MMOL/L — SIGNIFICANT CHANGE UP (ref 96–108)
CO2 SERPL-SCNC: 22 MMOL/L — SIGNIFICANT CHANGE UP (ref 22–31)
CREAT SERPL-MCNC: 1.1 MG/DL — SIGNIFICANT CHANGE UP (ref 0.5–1.3)
EGFR: 67 ML/MIN/1.73M2 — SIGNIFICANT CHANGE UP
GLUCOSE SERPL-MCNC: 106 MG/DL — HIGH (ref 70–99)
HCT VFR BLD CALC: 39.7 % — SIGNIFICANT CHANGE UP (ref 39–50)
HGB BLD-MCNC: 13.3 G/DL — SIGNIFICANT CHANGE UP (ref 13–17)
MAGNESIUM SERPL-MCNC: 2 MG/DL — SIGNIFICANT CHANGE UP (ref 1.6–2.6)
MCHC RBC-ENTMCNC: 30.2 PG — SIGNIFICANT CHANGE UP (ref 27–34)
MCHC RBC-ENTMCNC: 33.5 GM/DL — SIGNIFICANT CHANGE UP (ref 32–36)
MCV RBC AUTO: 90 FL — SIGNIFICANT CHANGE UP (ref 80–100)
NRBC # BLD: 0 /100 WBCS — SIGNIFICANT CHANGE UP (ref 0–0)
PHOSPHATE SERPL-MCNC: 2 MG/DL — LOW (ref 2.5–4.5)
PLATELET # BLD AUTO: 194 K/UL — SIGNIFICANT CHANGE UP (ref 150–400)
POTASSIUM SERPL-MCNC: 4.1 MMOL/L — SIGNIFICANT CHANGE UP (ref 3.5–5.3)
POTASSIUM SERPL-SCNC: 4.1 MMOL/L — SIGNIFICANT CHANGE UP (ref 3.5–5.3)
PROT SERPL-MCNC: 5.7 G/DL — LOW (ref 6–8.3)
RBC # BLD: 4.41 M/UL — SIGNIFICANT CHANGE UP (ref 4.2–5.8)
RBC # FLD: 12.6 % — SIGNIFICANT CHANGE UP (ref 10.3–14.5)
SODIUM SERPL-SCNC: 141 MMOL/L — SIGNIFICANT CHANGE UP (ref 135–145)
WBC # BLD: 5.17 K/UL — SIGNIFICANT CHANGE UP (ref 3.8–10.5)
WBC # FLD AUTO: 5.17 K/UL — SIGNIFICANT CHANGE UP (ref 3.8–10.5)

## 2022-11-27 PROCEDURE — 99232 SBSQ HOSP IP/OBS MODERATE 35: CPT

## 2022-11-27 RX ADMIN — DONEPEZIL HYDROCHLORIDE 5 MILLIGRAM(S): 10 TABLET, FILM COATED ORAL at 22:19

## 2022-11-27 RX ADMIN — Medication 25 MILLIGRAM(S): at 05:36

## 2022-11-27 RX ADMIN — PIPERACILLIN AND TAZOBACTAM 25 GRAM(S): 4; .5 INJECTION, POWDER, LYOPHILIZED, FOR SOLUTION INTRAVENOUS at 14:04

## 2022-11-27 RX ADMIN — TAMSULOSIN HYDROCHLORIDE 0.4 MILLIGRAM(S): 0.4 CAPSULE ORAL at 22:19

## 2022-11-27 RX ADMIN — ENOXAPARIN SODIUM 40 MILLIGRAM(S): 100 INJECTION SUBCUTANEOUS at 13:11

## 2022-11-27 RX ADMIN — PANTOPRAZOLE SODIUM 40 MILLIGRAM(S): 20 TABLET, DELAYED RELEASE ORAL at 13:06

## 2022-11-27 RX ADMIN — CYCLOSPORINE 75 MILLIGRAM(S): 100 CAPSULE ORAL at 13:23

## 2022-11-27 RX ADMIN — PIPERACILLIN AND TAZOBACTAM 25 GRAM(S): 4; .5 INJECTION, POWDER, LYOPHILIZED, FOR SOLUTION INTRAVENOUS at 05:36

## 2022-11-27 RX ADMIN — SENNA PLUS 2 TABLET(S): 8.6 TABLET ORAL at 22:19

## 2022-11-27 RX ADMIN — SODIUM CHLORIDE 100 MILLILITER(S): 9 INJECTION, SOLUTION INTRAVENOUS at 14:11

## 2022-11-27 RX ADMIN — Medication 50 MICROGRAM(S): at 22:23

## 2022-11-27 RX ADMIN — PIPERACILLIN AND TAZOBACTAM 25 GRAM(S): 4; .5 INJECTION, POWDER, LYOPHILIZED, FOR SOLUTION INTRAVENOUS at 22:19

## 2022-11-27 NOTE — PROGRESS NOTE ADULT - SUBJECTIVE AND OBJECTIVE BOX
[facility] -- Richa Castillo, Familia Brown, Terri Ross: Office # [phone]    Follow Up:  Cardiac clearance     Subjective/Observations: Patient seen and examined. Patient awake, alert, OOB to chair. No complaints of chest pain, dyspnea, palpitations or dizziness. No signs of orthopnea or PND. Tolerating room air.     REVIEW OF SYSTEMS: All other review of systems are negative unless indicated above    PAST MEDICAL & SURGICAL HISTORY:  Benign non-nodular prostatic hyperplasia, presence of lower urinary tract symptoms unspecified      Memory impairment      Hypothyroid      Cardiac dysrhythmia      H/O right inguinal hernia repair  1995      S/P appendectomy      S/P right inguinal hernia repair  2016, Dr. Parson      MEDICATIONS  (STANDING):  amLODIPine   Tablet 2.5 milliGRAM(s) Oral every 24 hours  cycloSPORINE  , modified (GENGRAF) 75 milliGRAM(s) Oral daily  donepezil 5 milliGRAM(s) Oral at bedtime  enoxaparin Injectable 40 milliGRAM(s) SubCutaneous every 24 hours  levothyroxine Injectable 50 MICROGram(s) IV Push at bedtime  metoprolol tartrate 25 milliGRAM(s) Oral daily  pantoprazole  Injectable 40 milliGRAM(s) IV Push daily  piperacillin/tazobactam IVPB.. 3.375 Gram(s) IV Intermittent every 8 hours  senna 2 Tablet(s) Oral at bedtime  sodium chloride 0.45%. 1000 milliLiter(s) (100 mL/Hr) IV Continuous <Continuous>  tamsulosin 0.4 milliGRAM(s) Oral at bedtime    MEDICATIONS  (PRN):  ketorolac   Injectable 15 milliGRAM(s) IV Push every 6 hours PRN Mild Pain (1 - 3)  ondansetron Injectable 4 milliGRAM(s) IV Push every 6 hours PRN Nausea    Allergies  bacitracin (Rash)    Vital Signs Last 24 Hrs  T(C): 36.3 (27 Nov 2022 05:07), Max: 36.8 (26 Nov 2022 21:12)  T(F): 97.4 (27 Nov 2022 05:07), Max: 98.2 (26 Nov 2022 21:12)  HR: 58 (27 Nov 2022 05:07) (58 - 66)  BP: 123/65 (27 Nov 2022 05:07) (123/65 - 159/77)  BP(mean): --  RR: 18 (27 Nov 2022 05:07) (18 - 18)  SpO2: 94% (27 Nov 2022 05:07) (92% - 94%)    Parameters below as of 27 Nov 2022 05:07  Patient On (Oxygen Delivery Method): room air      I&O's Summary    26 Nov 2022 07:01  -  27 Nov 2022 07:00  --------------------------------------------------------  IN: 0 mL / OUT: 150 mL / NET: -150 mL    27 Nov 2022 07:01  -  27 Nov 2022 11:30  --------------------------------------------------------  IN: 0 mL / OUT: 300 mL / NET: -300 mL          TELE:  PAC mostly 50s, but at times 80s  PHYSICAL EXAM:  Constitutional: NAD, awake and alert, Well developed   HEENT: Moist Mucous Membranes, Anicteric  Pulmonary: Non-labored, breath sounds are clear bilaterally, No wheezing, rales or rhonchi  Cardiovascular: Regular, S1 and S2, No murmurs, No rubs, gallops or clicks  Gastrointestinal:  soft, nontender, nondistended   Lymph: No peripheral edema. No lymphadenopathy.   Skin: No visible rashes or ulcers.  Psych:  Mood & affect appropriate      LABS: All Labs Reviewed:                        13.3   5.17  )-----------( 194      ( 27 Nov 2022 07:16 )             39.7                         14.2   8.16  )-----------( 203      ( 26 Nov 2022 07:00 )             42.1                         13.7   8.32  )-----------( 175      ( 25 Nov 2022 10:00 )             41.0     27 Nov 2022 07:16    141    |  108    |  21     ----------------------------<  106    4.1     |  22     |  1.10   26 Nov 2022 07:00    141    |  107    |  23     ----------------------------<  94     4.2     |  27     |  1.20   25 Nov 2022 12:40    140    |  108    |  23     ----------------------------<  100    4.0     |  27     |  1.30     Ca    9.4        27 Nov 2022 07:16  Ca    9.7        26 Nov 2022 07:00  Ca    9.2        25 Nov 2022 12:40  Phos  2.0       27 Nov 2022 07:16  Phos  1.7       26 Nov 2022 07:00  Mg     2.0       27 Nov 2022 07:16  Mg     1.9       26 Nov 2022 07:00    TPro  5.7    /  Alb  2.2    /  TBili  0.9    /  DBili  x      /  AST  18     /  ALT  22     /  AlkPhos  64     27 Nov 2022 07:16  TPro  6.6    /  Alb  2.6    /  TBili  1.1    /  DBili  x      /  AST  16     /  ALT  18     /  AlkPhos  71     26 Nov 2022 07:00  TPro  6.2    /  Alb  2.5    /  TBili  1.0    /  DBili  x      /  AST  19     /  ALT  22     /  AlkPhos  69     25 Nov 2022 12:40   LIVER FUNCTIONS - ( 27 Nov 2022 07:16 )  Alb: 2.2 g/dL / Pro: 5.7 g/dL / ALK PHOS: 64 U/L / ALT: 22 U/L / AST: 18 U/L / GGT: x           Troponin I, High Sensitivity Result: 7.1 ng/L (11-23-22 @ 12:00)    12 Lead ECG:   Ventricular Rate 64 BPM    Atrial Rate 64 BPM    P-R Interval 152 ms    QRS Duration 96 ms    Q-T Interval 398 ms    QTC Calculation(Bazett) 410 ms    P Axis 42 degrees    R Axis 37 degrees    T Axis 36 degrees    Diagnosis Line Sinus rhythm with marked sinus arrhythmia  Otherwise normal ECG  When compared with ECG of 23-NOV-2022 11:59, (Unconfirmed)  No significant change was found  Confirmed by kylie Ross ([id number]) on 11/24/2022 11:37:28 AM (11-24-22 @ 08:41)

## 2022-11-27 NOTE — OCCUPATIONAL THERAPY INITIAL EVALUATION ADULT - LIVES WITH, PROFILE
Patient reports he lives in a pvt house with wife, 2 GHAZALA with B/L HR, 1 flight to bedroom with HR, + stall shower with shower chair and grab bars/spouse

## 2022-11-27 NOTE — PROGRESS NOTE ADULT - ASSESSMENT
83 y/o M POD #4 s/p lap to open small bowel resection, NGT removed 11/25.   83 y/o M POD #4 s/p lap to open small bowel resection, NGT removed 11/25.        As in above full PA notation.  Mr. Cantu personally seen and examined during p.m. rounds.'  Passing flatus and tolerating clears.  Vitals non-suggestive.  Wounds clean and abdomen softly distended, but less so with appropriate incisional tenderness.  Labs reassuring.  Clinically, improving overall.  Surgically, stable for present.  To continue current supportive care.  Will request PT to see for general deconditioning.  Will ask for Urology consultation in light of re-insertion of Knight catheter.  Reviewed with patient in detail, wife and son at bedside, and Surgical PA.

## 2022-11-27 NOTE — OCCUPATIONAL THERAPY INITIAL EVALUATION ADULT - ADDITIONAL COMMENTS
Patient reports he was completely independent with ADL/IADLs and ambulated without AD prior to hospitalization

## 2022-11-27 NOTE — PROGRESS NOTE ADULT - SUBJECTIVE AND OBJECTIVE BOX
SURGERY PA NOTE ON BEHALF OF DR. BONILLA:    S: Patient seen and examined at bedside.   No acute events overnight.  Patient states passing flatus since yesterday PM, no BMs yet.  Patient is ambulating and voiding.  Denies fevers, chills, chest pain, SOB, palpitations, calf pain.      MEDICATIONS:  amLODIPine   Tablet 2.5 milliGRAM(s) Oral every 24 hours  cycloSPORINE  , modified (GENGRAF) 75 milliGRAM(s) Oral daily  donepezil 5 milliGRAM(s) Oral at bedtime  enoxaparin Injectable 40 milliGRAM(s) SubCutaneous every 24 hours  HYDROmorphone  Injectable 0.25 milliGRAM(s) IV Push every 4 hours PRN  HYDROmorphone  Injectable 0.5 milliGRAM(s) IV Push every 4 hours PRN  ketorolac   Injectable 15 milliGRAM(s) IV Push every 6 hours PRN  levothyroxine Injectable 50 MICROGram(s) IV Push at bedtime  metoprolol tartrate 25 milliGRAM(s) Oral daily  ondansetron Injectable 4 milliGRAM(s) IV Push every 6 hours PRN  pantoprazole  Injectable 40 milliGRAM(s) IV Push daily  piperacillin/tazobactam IVPB.. 3.375 Gram(s) IV Intermittent every 8 hours  senna 2 Tablet(s) Oral at bedtime  sodium chloride 0.45%. 1000 milliLiter(s) IV Continuous <Continuous>  tamsulosin 0.4 milliGRAM(s) Oral at bedtime      O:  Vital Signs Last 24 Hrs  T(C): 36.3 (27 Nov 2022 05:07), Max: 36.8 (26 Nov 2022 21:12)  T(F): 97.4 (27 Nov 2022 05:07), Max: 98.2 (26 Nov 2022 21:12)  HR: 58 (27 Nov 2022 05:07) (58 - 66)  BP: 123/65 (27 Nov 2022 05:07) (123/65 - 161/87)  BP(mean): --  RR: 18 (27 Nov 2022 05:07) (18 - 18)  SpO2: 94% (27 Nov 2022 05:07) (92% - 94%)    Parameters below as of 27 Nov 2022 05:07  Patient On (Oxygen Delivery Method): room air        I&O SUMMARY:    11-26-22 @ 07:01  -  11-27-22 @ 07:00  --------------------------------------------------------  IN: 0 mL / OUT: 150 mL / NET: -150 mL        PHYSICAL EXAM:  Lungs: CTA bilat without W/R/R  Card: S1S2  Abd: Soft, NT, ND.  +BS x 4.  No rebound/guarding.  Abdominal incisions c/d/i.    Ext: Calves soft, NT, without edema bilat    LABS:                        14.2   8.16  )-----------( 203      ( 26 Nov 2022 07:00 )             42.1     11-26    141  |  107  |  23  ----------------------------<  94  4.2   |  27  |  1.20    Ca    9.7      26 Nov 2022 07:00  Phos  1.7     11-26  Mg     1.9     11-26    TPro  6.6  /  Alb  2.6<L>  /  TBili  1.1  /  DBili  x   /  AST  16  /  ALT  18  /  AlkPhos  71  11-26

## 2022-11-27 NOTE — OCCUPATIONAL THERAPY INITIAL EVALUATION ADULT - PERTINENT HX OF CURRENT PROBLEM, REHAB EVAL
Patient is a 81 y/o male with pmhx of cognitive decline, idiopathic rash, BPH, cardiac arrhythmia, jejunal diverticulum, and hypothyroidism who presents with three days of abdominal pain and distention being admitted for small bowel perforation.

## 2022-11-27 NOTE — PROGRESS NOTE ADULT - ASSESSMENT
81 y/o M with BPH, hypothyroidism with recent ILR for possible arrhythmia presented from an [facility] for abdominal distention, diffuse bloating, and pain.  He was found to have perforated bowel and is s/p lap to open small bowel resection    Cardiac Optimization/Cardiac Arrhythmia  - p/w abd pain and distention, + bowel perforation, s/p SBR    - tolerated well from CV POV     - s/p ILR implantation (2 wks ago) for palpitations.    - EKG showed sinus arrhythmia with no ischemia.    - Tele with SR, no events, can d/c tele     - BP elevated 120-150s, resumed Metoprolol and Norvasc     - Pain management per primary   - Monitor and replete lytes, keep K>4, Mg>2.  - Will continue to follow.    Jenna López, MS FNP, [facility]  Nurse Practitioner- Cardiology   Spectra #[phone]

## 2022-11-27 NOTE — PROGRESS NOTE ADULT - SUBJECTIVE AND OBJECTIVE BOX
Patient is a 82y old  Male who presents with a chief complaint of small bowel perforation (27 Nov 2022 11:30)      INTERVAL /OVERNIGHT EVENTS: now on clears, + flatus, no BM    MEDICATIONS  (STANDING):  cycloSPORINE  , modified (GENGRAF) 75 milliGRAM(s) Oral daily  donepezil 5 milliGRAM(s) Oral at bedtime  enoxaparin Injectable 40 milliGRAM(s) SubCutaneous every 24 hours  levothyroxine Injectable 50 MICROGram(s) IV Push at bedtime  metoprolol tartrate 25 milliGRAM(s) Oral daily  pantoprazole  Injectable 40 milliGRAM(s) IV Push daily  piperacillin/tazobactam IVPB.. 3.375 Gram(s) IV Intermittent every 8 hours  senna 2 Tablet(s) Oral at bedtime  sodium chloride 0.45%. 1000 milliLiter(s) (75 mL/Hr) IV Continuous <Continuous>  tamsulosin 0.4 milliGRAM(s) Oral at bedtime    MEDICATIONS  (PRN):  ketorolac   Injectable 15 milliGRAM(s) IV Push every 6 hours PRN Mild Pain (1 - 3)  ondansetron Injectable 4 milliGRAM(s) IV Push every 6 hours PRN Nausea      Allergies    bacitracin (Rash)    Intolerances        REVIEW OF SYSTEMS:  denies    Vital Signs Last 24 Hrs  T(C): 36.4 (27 Nov 2022 11:44), Max: 36.8 (26 Nov 2022 21:12)  T(F): 97.5 (27 Nov 2022 11:44), Max: 98.2 (26 Nov 2022 21:12)  HR: 61 (27 Nov 2022 11:44) (58 - 65)  BP: 111/58 (27 Nov 2022 11:44) (111/58 - 159/77)  BP(mean): --  RR: 18 (27 Nov 2022 11:44) (18 - 18)  SpO2: 97% (27 Nov 2022 11:44) (92% - 97%)    Parameters below as of 27 Nov 2022 11:44  Patient On (Oxygen Delivery Method): room air        PHYSICAL EXAM:  GENERAL: NAD, well-groomed, well-developed  HEAD:  Atraumatic, Normocephalic  EYES: EOMI, PERRLA, conjunctiva and sclera clear  ENMT: No tonsillar erythema, exudates, or enlargement; Moist mucous membranes, Good dentition, No lesions  NECK: Supple, No JVD, Normal thyroid  NERVOUS SYSTEM:  Alert & Oriented X3, Good concentration; Motor Strength 5/5 B/L upper and lower extremities; DTRs 2+ intact and symmetric  CHEST/LUNG: Clear to auscultation bilaterally; No rales, rhonchi, wheezing, or rubs  HEART: Regular rate and rhythm; No murmurs, rubs, or gallops  ABDOMEN: Soft, Nontender, Nondistended; Bowel sounds present  EXTREMITIES:  2+ Peripheral Pulses, No clubbing, cyanosis, or edema  LYMPH: No lymphadenopathy noted  SKIN: No rashes or lesions    LABS:                        13.3   5.17  )-----------( 194      ( 27 Nov 2022 07:16 )             39.7     27 Nov 2022 07:16    141    |  108    |  21     ----------------------------<  106    4.1     |  22     |  1.10     Ca    9.4        27 Nov 2022 07:16  Phos  2.0       27 Nov 2022 07:16  Mg     2.0       27 Nov 2022 07:16    TPro  5.7    /  Alb  2.2    /  TBili  0.9    /  DBili  x      /  AST  18     /  ALT  22     /  AlkPhos  64     27 Nov 2022 07:16        CAPILLARY BLOOD GLUCOSE          RADIOLOGY & ADDITIONAL TESTS:    Notes Reviewed:  [x ] YES  [ ] NO    Care Discussed with Consultants/Other Providers [x ] YES  [ ] NO

## 2022-11-27 NOTE — OCCUPATIONAL THERAPY INITIAL EVALUATION ADULT - REHAB POTENTIAL, OT EVAL
No OT interventions warranted at this time as patient is performing at baseline of MI/supervision for ADL tasks/none

## 2022-11-27 NOTE — PROGRESS NOTE ADULT - PROBLEM SELECTOR PLAN 1
- Consider diet advancement to clear liquids as patient showing GI function  - Encouraged OOB/IS use/Ambulation  - Monitor I&Os  - F/u am labs  - Will discuss w/ Dr. Barnett (covering for Dr. Chiu)

## 2022-11-28 LAB
ALBUMIN SERPL ELPH-MCNC: 2.2 G/DL — LOW (ref 3.3–5)
ALP SERPL-CCNC: 70 U/L — SIGNIFICANT CHANGE UP (ref 40–120)
ALT FLD-CCNC: 38 U/L — SIGNIFICANT CHANGE UP (ref 12–78)
ANION GAP SERPL CALC-SCNC: 7 MMOL/L — SIGNIFICANT CHANGE UP (ref 5–17)
AST SERPL-CCNC: 31 U/L — SIGNIFICANT CHANGE UP (ref 15–37)
BILIRUB SERPL-MCNC: 0.7 MG/DL — SIGNIFICANT CHANGE UP (ref 0.2–1.2)
BUN SERPL-MCNC: 18 MG/DL — SIGNIFICANT CHANGE UP (ref 7–23)
CALCIUM SERPL-MCNC: 9.1 MG/DL — SIGNIFICANT CHANGE UP (ref 8.5–10.1)
CHLORIDE SERPL-SCNC: 110 MMOL/L — HIGH (ref 96–108)
CO2 SERPL-SCNC: 26 MMOL/L — SIGNIFICANT CHANGE UP (ref 22–31)
CREAT SERPL-MCNC: 1.1 MG/DL — SIGNIFICANT CHANGE UP (ref 0.5–1.3)
EGFR: 67 ML/MIN/1.73M2 — SIGNIFICANT CHANGE UP
GLUCOSE SERPL-MCNC: 109 MG/DL — HIGH (ref 70–99)
HCT VFR BLD CALC: 37.8 % — LOW (ref 39–50)
HGB BLD-MCNC: 12.9 G/DL — LOW (ref 13–17)
MAGNESIUM SERPL-MCNC: 1.9 MG/DL — SIGNIFICANT CHANGE UP (ref 1.6–2.6)
MCHC RBC-ENTMCNC: 29.9 PG — SIGNIFICANT CHANGE UP (ref 27–34)
MCHC RBC-ENTMCNC: 34.1 GM/DL — SIGNIFICANT CHANGE UP (ref 32–36)
MCV RBC AUTO: 87.5 FL — SIGNIFICANT CHANGE UP (ref 80–100)
NRBC # BLD: 0 /100 WBCS — SIGNIFICANT CHANGE UP (ref 0–0)
PHOSPHATE SERPL-MCNC: 2.1 MG/DL — LOW (ref 2.5–4.5)
PLATELET # BLD AUTO: 212 K/UL — SIGNIFICANT CHANGE UP (ref 150–400)
POTASSIUM SERPL-MCNC: 3.5 MMOL/L — SIGNIFICANT CHANGE UP (ref 3.5–5.3)
POTASSIUM SERPL-SCNC: 3.5 MMOL/L — SIGNIFICANT CHANGE UP (ref 3.5–5.3)
PROT SERPL-MCNC: 5.4 G/DL — LOW (ref 6–8.3)
RBC # BLD: 4.32 M/UL — SIGNIFICANT CHANGE UP (ref 4.2–5.8)
RBC # FLD: 12.3 % — SIGNIFICANT CHANGE UP (ref 10.3–14.5)
SODIUM SERPL-SCNC: 143 MMOL/L — SIGNIFICANT CHANGE UP (ref 135–145)
WBC # BLD: 5.13 K/UL — SIGNIFICANT CHANGE UP (ref 3.8–10.5)
WBC # FLD AUTO: 5.13 K/UL — SIGNIFICANT CHANGE UP (ref 3.8–10.5)

## 2022-11-28 PROCEDURE — 74018 RADEX ABDOMEN 1 VIEW: CPT | Mod: 26

## 2022-11-28 PROCEDURE — 99232 SBSQ HOSP IP/OBS MODERATE 35: CPT

## 2022-11-28 RX ORDER — POTASSIUM CHLORIDE 20 MEQ
40 PACKET (EA) ORAL ONCE
Refills: 0 | Status: COMPLETED | OUTPATIENT
Start: 2022-11-28 | End: 2022-11-28

## 2022-11-28 RX ORDER — PANTOPRAZOLE SODIUM 20 MG/1
40 TABLET, DELAYED RELEASE ORAL
Refills: 0 | Status: DISCONTINUED | OUTPATIENT
Start: 2022-11-28 | End: 2022-12-01

## 2022-11-28 RX ORDER — METOPROLOL TARTRATE 50 MG
25 TABLET ORAL DAILY
Refills: 0 | Status: DISCONTINUED | OUTPATIENT
Start: 2022-11-28 | End: 2022-12-01

## 2022-11-28 RX ORDER — SODIUM,POTASSIUM PHOSPHATES 278-250MG
2 POWDER IN PACKET (EA) ORAL ONCE
Refills: 0 | Status: COMPLETED | OUTPATIENT
Start: 2022-11-28 | End: 2022-11-28

## 2022-11-28 RX ORDER — SODIUM CHLORIDE 9 MG/ML
1000 INJECTION INTRAMUSCULAR; INTRAVENOUS; SUBCUTANEOUS
Refills: 0 | Status: DISCONTINUED | OUTPATIENT
Start: 2022-11-28 | End: 2022-11-29

## 2022-11-28 RX ADMIN — PANTOPRAZOLE SODIUM 40 MILLIGRAM(S): 20 TABLET, DELAYED RELEASE ORAL at 11:45

## 2022-11-28 RX ADMIN — Medication 2 PACKET(S): at 09:24

## 2022-11-28 RX ADMIN — PIPERACILLIN AND TAZOBACTAM 25 GRAM(S): 4; .5 INJECTION, POWDER, LYOPHILIZED, FOR SOLUTION INTRAVENOUS at 23:05

## 2022-11-28 RX ADMIN — SODIUM CHLORIDE 25 MILLILITER(S): 9 INJECTION INTRAMUSCULAR; INTRAVENOUS; SUBCUTANEOUS at 13:41

## 2022-11-28 RX ADMIN — SENNA PLUS 2 TABLET(S): 8.6 TABLET ORAL at 23:04

## 2022-11-28 RX ADMIN — SODIUM CHLORIDE 100 MILLILITER(S): 9 INJECTION INTRAMUSCULAR; INTRAVENOUS; SUBCUTANEOUS at 23:05

## 2022-11-28 RX ADMIN — PIPERACILLIN AND TAZOBACTAM 25 GRAM(S): 4; .5 INJECTION, POWDER, LYOPHILIZED, FOR SOLUTION INTRAVENOUS at 05:33

## 2022-11-28 RX ADMIN — SODIUM CHLORIDE 25 MILLILITER(S): 9 INJECTION, SOLUTION INTRAVENOUS at 10:42

## 2022-11-28 RX ADMIN — SODIUM CHLORIDE 100 MILLILITER(S): 9 INJECTION INTRAMUSCULAR; INTRAVENOUS; SUBCUTANEOUS at 15:58

## 2022-11-28 RX ADMIN — ENOXAPARIN SODIUM 40 MILLIGRAM(S): 100 INJECTION SUBCUTANEOUS at 13:25

## 2022-11-28 RX ADMIN — PIPERACILLIN AND TAZOBACTAM 25 GRAM(S): 4; .5 INJECTION, POWDER, LYOPHILIZED, FOR SOLUTION INTRAVENOUS at 13:25

## 2022-11-28 RX ADMIN — Medication 40 MILLIEQUIVALENT(S): at 09:23

## 2022-11-28 RX ADMIN — DONEPEZIL HYDROCHLORIDE 5 MILLIGRAM(S): 10 TABLET, FILM COATED ORAL at 23:04

## 2022-11-28 RX ADMIN — Medication 25 MILLIGRAM(S): at 05:33

## 2022-11-28 RX ADMIN — TAMSULOSIN HYDROCHLORIDE 0.4 MILLIGRAM(S): 0.4 CAPSULE ORAL at 23:04

## 2022-11-28 RX ADMIN — CYCLOSPORINE 75 MILLIGRAM(S): 100 CAPSULE ORAL at 11:45

## 2022-11-28 RX ADMIN — Medication 50 MICROGRAM(S): at 23:05

## 2022-11-28 NOTE — PROGRESS NOTE ADULT - ASSESSMENT
Mr. Cantu is a 82 yoM with cognitive decline, bph, cardiac arrhythmia, and hypothyroidism who presented with small bowel perforation and is s/p small bowel resection on 11/23. NGT removed on 11/25. Postoperatively patient is doing well but waiting for return of bowel function. Pt has passed flatus but not recently. Pain is controlled.    Plan:  - Await return of bowel function   - Advance diet as tolerated  -- Continue on CLD until more flatus, BM, or abd distention improves  - Continue fluids until full toleration of diet  - Monitor incisions  - Daily labs, Replete PRN  - OOB   - Continue PT     Case Discussed with Dr. Chiu    Dispo: [facility] Mr. Cantu is a 82 yoM with cognitive decline, bph, cardiac arrhythmia, and hypothyroidism who presented with small bowel perforation and is s/p small bowel resection on 11/23. NGT removed on 11/25. Postoperatively patient is doing well but waiting for return of bowel function. Pt has passed flatus but not recently. Pain is controlled.    Plan:  - Await return of bowel function   - Advance diet as tolerated  -- Continue on CLD until more flatus, BM, or abd distention improves  - Continue fluids until full toleration of diet  - Monitor incisions  - Daily labs, Replete PRN  - OOB   - Continue PT     Case Discussed with Dr. Chiu    Dispo: [facility]    Surg. Att.  Pt. was in a chair when seen this AM. He is walking on his own. States he has been passing gas today , still no BM. Tolerates liquids well.  Abd bloated , soft and nontender. Wound clean.  Labs reviewed.  AXR was ordered to assess gas pattern.  Please, do not start Cyclosporine on this patient, freshly post bowel resection.

## 2022-11-28 NOTE — PROGRESS NOTE ADULT - SUBJECTIVE AND OBJECTIVE BOX
[facility] -- Richa Castillo Pannella, Patel, Savella [facility]  Office # [phone]      Follow Up:    cardiac optimization   Subjective/Observations:     No events overnight resting comfortably in bed.  No complaints of chest pain, dyspnea, or palpitations reported. No signs of orthopnea or PND.    REVIEW OF SYSTEMS: All other review of systems is negative unless indicated above    PAST MEDICAL & SURGICAL HISTORY:  Benign non-nodular prostatic hyperplasia, presence of lower urinary tract symptoms unspecified      Memory impairment      Hypothyroid      Cardiac dysrhythmia      H/O right inguinal hernia repair        S/P appendectomy      S/P right inguinal hernia repair  2016, Dr. Parson          MEDICATIONS  (STANDING):  cycloSPORINE  , modified (GENGRAF) 75 milliGRAM(s) Oral daily  donepezil 5 milliGRAM(s) Oral at bedtime  enoxaparin Injectable 40 milliGRAM(s) SubCutaneous every 24 hours  levothyroxine Injectable 50 MICROGram(s) IV Push at bedtime  metoprolol tartrate 25 milliGRAM(s) Oral daily  pantoprazole  Injectable 40 milliGRAM(s) IV Push daily  piperacillin/tazobactam IVPB.. 3.375 Gram(s) IV Intermittent every 8 hours  senna 2 Tablet(s) Oral at bedtime  sodium chloride 0.45%. 1000 milliLiter(s) (75 mL/Hr) IV Continuous <Continuous>  tamsulosin 0.4 milliGRAM(s) Oral at bedtime    MEDICATIONS  (PRN):  ketorolac   Injectable 15 milliGRAM(s) IV Push every 6 hours PRN Mild Pain (1 - 3)  ondansetron Injectable 4 milliGRAM(s) IV Push every 6 hours PRN Nausea      Allergies    bacitracin (Rash)    Intolerances        Vital Signs Last 24 Hrs  T(C): 36.8 (2022 05:36), Max: 36.8 (2022 05:36)  T(F): 98.2 (2022 05:36), Max: 98.2 (2022 05:36)  HR: 63 (2022 05:36) (61 - 76)  BP: 123/65 (2022 05:36) (111/58 - 174/70)  BP(mean): --  RR: 17 (2022 05:36) (17 - 18)  SpO2: 94% (2022 05:36) (93% - 97%)    Parameters below as of 2022 05:36  Patient On (Oxygen Delivery Method): room air        I&O's Summary    2022 07:01  -  2022 07:00  --------------------------------------------------------  IN: 900 mL / OUT: 650 mL / NET: 250 mL          PHYSICAL EXAM:  TELE:   Constitutional: NAD, awake and alert, well-developed  HEENT: Moist Mucous Membranes, Anicteric  Pulmonary: Non-labored, breath sounds are clear bilaterally, No wheezing, crackles or rhonchi  Cardiovascular: Regular, S1 and S2 nl, No murmurs, rubs, gallops or clicks  Gastrointestinal: Bowel Sounds present, soft, nontender.   Lymph: No lymphadenopathy. No peripheral edema.  Skin: No visible rashes or ulcers.  Psych:  Mood & affect appropriate    LABS: All Labs Reviewed:                        13.3   5.17  )-----------( 194      ( 2022 07:16 )             39.7                         14.2   8.16  )-----------( 203      ( 2022 07:00 )             42.1                         13.7   8.32  )-----------( 175      ( 2022 10:00 )             41.0     2022 07:16    141    |  108    |  21     ----------------------------<  106    4.1     |  22     |  1.10   2022 07:00    141    |  107    |  23     ----------------------------<  94     4.2     |  27     |  1.20   2022 12:40    140    |  108    |  23     ----------------------------<  100    4.0     |  27     |  1.30     Ca    9.4        2022 07:16  Ca    9.7        2022 07:00  Ca    9.2        2022 12:40  Phos  2.0       2022 07:16  Phos  1.7       2022 07:00  Mg     2.0       2022 07:16  Mg     1.9       2022 07:00    TPro  5.7    /  Alb  2.2    /  TBili  0.9    /  DBili  x      /  AST  18     /  ALT  22     /  AlkPhos  64     2022 07:16  TPro  6.6    /  Alb  2.6    /  TBili  1.1    /  DBili  x      /  AST  16     /  ALT  18     /  AlkPhos  71     2022 07:00  TPro  6.2    /  Alb  2.5    /  TBili  1.0    /  DBili  x      /  AST  19     /  ALT  22     /  AlkPhos  69     2022 12:40             EC Lead ECG:   Ventricular Rate 64 BPM    Atrial Rate 64 BPM    P-R Interval 152 ms    QRS Duration 96 ms    Q-T Interval 398 ms    QTC Calculation(Bazett) 410 ms    P Axis 42 degrees    R Axis 37 degrees    T Axis 36 degrees    Diagnosis Line Sinus rhythm with marked sinus arrhythmia  Otherwise normal ECG  When compared with ECG of 2022 11:59, (Unconfirmed)  No significant change was found  Confirmed by kylie Ross ([id number]) on 2022 11:37:28 AM (22 @ 08:41)           [facility] -- Richa Castillo Pannella, Patel, Savella [facility]  Office # [phone]      Follow Up:    cardiac optimization   Subjective/Observations:     No events overnight resting comfortably in chair.  No complaints of chest pain, dyspnea, or palpitations reported. No signs of orthopnea or PND.  remains on room air     REVIEW OF SYSTEMS: All other review of systems is negative unless indicated above    PAST MEDICAL & SURGICAL HISTORY:  Benign non-nodular prostatic hyperplasia, presence of lower urinary tract symptoms unspecified      Memory impairment      Hypothyroid      Cardiac dysrhythmia      H/O right inguinal hernia repair        S/P appendectomy      S/P right inguinal hernia repair  , Dr. Parson          MEDICATIONS  (STANDING):  cycloSPORINE  , modified (GENGRAF) 75 milliGRAM(s) Oral daily  donepezil 5 milliGRAM(s) Oral at bedtime  enoxaparin Injectable 40 milliGRAM(s) SubCutaneous every 24 hours  levothyroxine Injectable 50 MICROGram(s) IV Push at bedtime  metoprolol tartrate 25 milliGRAM(s) Oral daily  pantoprazole  Injectable 40 milliGRAM(s) IV Push daily  piperacillin/tazobactam IVPB.. 3.375 Gram(s) IV Intermittent every 8 hours  senna 2 Tablet(s) Oral at bedtime  sodium chloride 0.45%. 1000 milliLiter(s) (75 mL/Hr) IV Continuous <Continuous>  tamsulosin 0.4 milliGRAM(s) Oral at bedtime    MEDICATIONS  (PRN):  ketorolac   Injectable 15 milliGRAM(s) IV Push every 6 hours PRN Mild Pain (1 - 3)  ondansetron Injectable 4 milliGRAM(s) IV Push every 6 hours PRN Nausea      Allergies    bacitracin (Rash)    Intolerances        Vital Signs Last 24 Hrs  T(C): 36.8 (2022 05:36), Max: 36.8 (2022 05:36)  T(F): 98.2 (2022 05:36), Max: 98.2 (2022 05:36)  HR: 63 (2022 05:36) (61 - 76)  BP: 123/65 (2022 05:36) (111/58 - 174/70)  BP(mean): --  RR: 17 (2022 05:36) (17 - 18)  SpO2: 94% (2022 05:36) (93% - 97%)    Parameters below as of 2022 05:36  Patient On (Oxygen Delivery Method): room air        I&O's Summary    2022 07:01  -  2022 07:00  --------------------------------------------------------  IN: 900 mL / OUT: 650 mL / NET: 250 mL          PHYSICAL EXAM:  TELE: not on tele   Constitutional: NAD, awake and alert, well-developed  HEENT: Moist Mucous Membranes, Anicteric  Pulmonary: Non-labored, breath sounds are clear bilaterally, No wheezing, crackles or rhonchi  Cardiovascular: Regular, S1 and S2 nl, No murmurs, rubs, gallops or clicks  Gastrointestinal: Bowel Sounds present, soft, nontender.   Lymph: No lymphadenopathy. No peripheral edema.  Skin: No visible rashes or ulcers.  Psych:  Mood & affect appropriate    LABS: All Labs Reviewed:                        13.3   5.17  )-----------( 194      ( 2022 07:16 )             39.7                         14.2   8.16  )-----------( 203      ( 2022 07:00 )             42.1                         13.7   8.32  )-----------( 175      ( 2022 10:00 )             41.0     2022 07:16    141    |  108    |  21     ----------------------------<  106    4.1     |  22     |  1.10   2022 07:00    141    |  107    |  23     ----------------------------<  94     4.2     |  27     |  1.20   2022 12:40    140    |  108    |  23     ----------------------------<  100    4.0     |  27     |  1.30     Ca    9.4        2022 07:16  Ca    9.7        2022 07:00  Ca    9.2        2022 12:40  Phos  2.0       2022 07:16  Phos  1.7       2022 07:00  Mg     2.0       2022 07:16  Mg     1.9       2022 07:00    TPro  5.7    /  Alb  2.2    /  TBili  0.9    /  DBili  x      /  AST  18     /  ALT  22     /  AlkPhos  64     2022 07:16  TPro  6.6    /  Alb  2.6    /  TBili  1.1    /  DBili  x      /  AST  16     /  ALT  18     /  AlkPhos  71     2022 07:00  TPro  6.2    /  Alb  2.5    /  TBili  1.0    /  DBili  x      /  AST  19     /  ALT  22     /  AlkPhos  69     2022 12:40             EC Lead ECG:   Ventricular Rate 64 BPM    Atrial Rate 64 BPM    P-R Interval 152 ms    QRS Duration 96 ms    Q-T Interval 398 ms    QTC Calculation(Bazett) 410 ms    P Axis 42 degrees    R Axis 37 degrees    T Axis 36 degrees    Diagnosis Line Sinus rhythm with marked sinus arrhythmia  Otherwise normal ECG  When compared with ECG of 2022 11:59, (Unconfirmed)  No significant change was found  Confirmed by kylie Ross ([id number]) on 2022 11:37:28 AM (22 @ 08:41)

## 2022-11-28 NOTE — PROGRESS NOTE ADULT - ASSESSMENT
83 y/o M with BPH, hypothyroidism with recent ILR for possible arrhythmia presented from an [facility]gent [facility] for abdominal distention, diffuse bloating, and pain.  He was found to have perforated bowel and is s/p lap to open small bowel resection    Cardiac Optimization/Cardiac Arrhythmia  - p/w abd pain and distention, + bowel perforation, s/p Small bowel resection  POD 5  - tolerated well from CV POV   - s/p ILR implantation (2 wks ago) for palpitations.    - EKG showed sinus arrhythmia with no ischemia.    - /64 continue Metoprolol  - Pain management per primary   - Monitor and replete lytes, keep K>4, Mg>2.  Roxann Garcia FNP-C  Cardiology NP  [facility] [phone]   83 y/o M with BPH, hypothyroidism with recent ILR for possible arrhythmia presented from an Urgent Care Ctr for abdominal distention, diffuse bloating, and pain.  He was found to have perforated bowel and is s/p lap to open small bowel resection    Cardiac Optimization/Cardiac Arrhythmia  - p/w abd pain and distention, + bowel perforation, s/p Small bowel resection  POD 5  - tolerated well from CV POV   - s/p ILR implantation (2 wks ago) for palpitations.    - EKG showed sinus arrhythmia with no ischemia.    - /64 , change lopressor to home medication toprol 25mg po daily     - Monitor and replete lytes, keep K>4, Mg>2.  Roxann Garcia FNP-C  Cardiology NP  [facility] [phone]

## 2022-11-28 NOTE — GOALS OF CARE CONVERSATION - ADVANCED CARE PLANNING - CONVERSATION DETAILS
Writer met with pt. and wife at bedside. Reviewed patient's medical and social history as well as events leading to patient's hospitalization. Writer discussed patient's current diagnosis (sm. bowel perf., s/p ex lap, jejunum diverticulum, cardiac arry. yet to be diagnosed, cognitive decline ,BPH.), medical condition and management, prognosis. Pt seemed A& O x4 Inquired about patient's wishes regarding extent of medical care to be provided including escalation of medical care into the ICU and use of vasopressor support. In addition, the writer inquired about thoughts regarding cardiopulmonary resuscitation, artificial nutrition and hydration including use of feeding tubes and IVF, antibiotics, and further investigative studies such as blood draws and radiology. Pt and wife  showed good insight into medical condition. All questions answered. Recommended pt complete advance directives ..He agreed to DNR/DNI Molst placed on chart

## 2022-11-28 NOTE — PROGRESS NOTE ADULT - SUBJECTIVE AND OBJECTIVE BOX
Patient is a 82y old  Male who presents with a chief complaint of small bowel perforation (28 Nov 2022 09:05)      INTERVAL /OVERNIGHT EVENTS: ? flatus, no BM    MEDICATIONS  (STANDING):  donepezil 5 milliGRAM(s) Oral at bedtime  enoxaparin Injectable 40 milliGRAM(s) SubCutaneous every 24 hours  levothyroxine Injectable 50 MICROGram(s) IV Push at bedtime  metoprolol succinate ER 25 milliGRAM(s) Oral daily  pantoprazole    Tablet 40 milliGRAM(s) Oral before breakfast  piperacillin/tazobactam IVPB.. 3.375 Gram(s) IV Intermittent every 8 hours  senna 2 Tablet(s) Oral at bedtime  sodium chloride 0.9%. 1000 milliLiter(s) (100 mL/Hr) IV Continuous <Continuous>  tamsulosin 0.4 milliGRAM(s) Oral at bedtime    MEDICATIONS  (PRN):  ketorolac   Injectable 15 milliGRAM(s) IV Push every 6 hours PRN Mild Pain (1 - 3)  ondansetron Injectable 4 milliGRAM(s) IV Push every 6 hours PRN Nausea      Allergies    bacitracin (Rash)    Intolerances        REVIEW OF SYSTEMS:  CONSTITUTIONAL: No fever, weight loss, or fatigue  EYES: No eye pain, visual disturbances, or discharge  ENMT:  No difficulty hearing, tinnitus, vertigo; No sinus or throat pain  NECK: No pain or stiffness  RESPIRATORY: No cough, wheezing, chills or hemoptysis; No shortness of breath  CARDIOVASCULAR: No chest pain, palpitations, dizziness, or leg swelling  GASTROINTESTINAL: No abdominal or epigastric pain. No nausea, vomiting, or hematemesis; No diarrhea or constipation. No melena or hematochezia.  GENITOURINARY: No dysuria, frequency, hematuria, or incontinence  NEUROLOGICAL: No headaches, memory loss, loss of strength, numbness, or tremors  SKIN: No itching, burning, rashes, or lesions   LYMPH NODES: No enlarged glands  ENDOCRINE: No heat or cold intolerance; No hair loss; No polydipsia or polyuria  MUSCULOSKELETAL: No joint pain or swelling; No muscle, back, or extremity pain  PSYCHIATRIC: No depression, anxiety, mood swings, or difficulty sleeping  HEME/LYMPH: No easy bruising, or bleeding gums  ALLERGY AND IMMUNOLOGIC: No hives or eczema    Vital Signs Last 24 Hrs  T(C): 36.5 (28 Nov 2022 11:42), Max: 36.8 (28 Nov 2022 05:36)  T(F): 97.7 (28 Nov 2022 11:42), Max: 98.2 (28 Nov 2022 05:36)  HR: 62 (28 Nov 2022 11:42) (62 - 76)  BP: 131/80 (28 Nov 2022 11:42) (123/65 - 174/70)  BP(mean): --  RR: 17 (28 Nov 2022 11:42) (17 - 18)  SpO2: 94% (28 Nov 2022 11:42) (93% - 96%)    Parameters below as of 28 Nov 2022 11:42  Patient On (Oxygen Delivery Method): room air        PHYSICAL EXAM:  GENERAL: NAD, well-groomed, well-developed  HEAD:  Atraumatic, Normocephalic  EYES: EOMI, PERRLA, conjunctiva and sclera clear  ENMT: No tonsillar erythema, exudates, or enlargement; Moist mucous membranes, Good dentition, No lesions  NECK: Supple, No JVD, Normal thyroid  NERVOUS SYSTEM:  Alert & Oriented X3, Good concentration; Motor Strength 5/5 B/L upper and lower extremities; DTRs 2+ intact and symmetric  CHEST/LUNG: Clear to auscultation bilaterally; No rales, rhonchi, wheezing, or rubs  HEART: Regular rate and rhythm; No murmurs, rubs, or gallops  ABDOMEN: Soft, Nontender, Nondistended; Bowel sounds present  EXTREMITIES:  2+ Peripheral Pulses, No clubbing, cyanosis, or edema  LYMPH: No lymphadenopathy noted  SKIN: No rashes or lesions    LABS:                        12.9   5.13  )-----------( 212      ( 28 Nov 2022 06:28 )             37.8     28 Nov 2022 06:28    143    |  110    |  18     ----------------------------<  109    3.5     |  26     |  1.10     Ca    9.1        28 Nov 2022 06:28  Phos  2.1       28 Nov 2022 06:28  Mg     1.9       28 Nov 2022 06:28    TPro  5.4    /  Alb  2.2    /  TBili  0.7    /  DBili  x      /  AST  31     /  ALT  38     /  AlkPhos  70     28 Nov 2022 06:28        CAPILLARY BLOOD GLUCOSE          RADIOLOGY & ADDITIONAL TESTS:    Notes Reviewed:  [ x] YES  [ ] NO    Care Discussed with Consultants/Other Providers [x ] YES  [ ] NO

## 2022-11-28 NOTE — PROGRESS NOTE ADULT - SUBJECTIVE AND OBJECTIVE BOX
HPI/OVERNIGHT EVENTS:  NAEO. Patient seen and examined at bedside. Pt is tolerating clear diet without nausea or vomiting. States last episode is flatus is possibly 2 days prior but is unsure. Denies BM/f/c/n/v. Patient is urinating freely with assistance by RN.     MEDICATIONS  (STANDING):  cycloSPORINE  , modified (GENGRAF) 75 milliGRAM(s) Oral daily  donepezil 5 milliGRAM(s) Oral at bedtime  enoxaparin Injectable 40 milliGRAM(s) SubCutaneous every 24 hours  levothyroxine Injectable 50 MICROGram(s) IV Push at bedtime  metoprolol tartrate 25 milliGRAM(s) Oral daily  pantoprazole  Injectable 40 milliGRAM(s) IV Push daily  piperacillin/tazobactam IVPB.. 3.375 Gram(s) IV Intermittent every 8 hours  potassium chloride    Tablet ER 40 milliEquivalent(s) Oral once  potassium phosphate / sodium phosphate Powder (PHOS-NaK) 2 Packet(s) Oral once  senna 2 Tablet(s) Oral at bedtime  sodium chloride 0.45%. 1000 milliLiter(s) (75 mL/Hr) IV Continuous <Continuous>  tamsulosin 0.4 milliGRAM(s) Oral at bedtime    MEDICATIONS  (PRN):  ketorolac   Injectable 15 milliGRAM(s) IV Push every 6 hours PRN Mild Pain (1 - 3)  ondansetron Injectable 4 milliGRAM(s) IV Push every 6 hours PRN Nausea    Vital Signs Last 24 Hrs  T(C): 36.8 (28 Nov 2022 05:36), Max: 36.8 (28 Nov 2022 05:36)  T(F): 98.2 (28 Nov 2022 05:36), Max: 98.2 (28 Nov 2022 05:36)  HR: 63 (28 Nov 2022 05:36) (61 - 76)  BP: 123/65 (28 Nov 2022 05:36) (111/58 - 174/70)  BP(mean): --  RR: 17 (28 Nov 2022 05:36) (17 - 18)  SpO2: 94% (28 Nov 2022 05:36) (93% - 97%)    Parameters below as of 28 Nov 2022 05:36  Patient On (Oxygen Delivery Method): room air    Constitutional: patient resting comfortably in bed, in no acute distress  HEENT: EOMI, atraumatic  Neck: No JVD   Respiratory: respirations are unlabored, no accessory muscle use, no conversational dyspnea  Gastrointestinal: Abdomen distended, tympanic, incisions c/d/i, soft, non-tender, no rebound tenderness / guarding  Neurological: GCS: 15 (4/5/6)       I&O's Detail    27 Nov 2022 07:01  -  28 Nov 2022 07:00  --------------------------------------------------------  IN:    sodium chloride 0.45%: 900 mL  Total IN: 900 mL    OUT:    Voided (mL): 650 mL  Total OUT: 650 mL  Total NET: 250 mL    28 Nov 2022 07:01  -  28 Nov 2022 09:05  --------------------------------------------------------  IN:    Oral Fluid: 350 mL  Total IN: 350 mL  OUT:    Voided (mL): 350 mL  Total OUT: 350 mL  Total NET: 0 mL          LABS:             12.9   5.13  )-----------( 212      ( 28 Nov 2022 06:28 )             37.8     11-28    143  |  110<H>  |  18  ----------------------------<  109<H>  3.5   |  26  |  1.10    Ca    9.1      28 Nov 2022 06:28  Phos  2.1     11-28  Mg     1.9     11-28    TPro  5.4<L>  /  Alb  2.2<L>  /  TBili  0.7  /  DBili  x   /  AST  31  /  ALT  38  /  AlkPhos  70  11-28

## 2022-11-29 LAB
ANION GAP SERPL CALC-SCNC: 3 MMOL/L — LOW (ref 5–17)
BASOPHILS # BLD AUTO: 0.02 K/UL — SIGNIFICANT CHANGE UP (ref 0–0.2)
BASOPHILS NFR BLD AUTO: 0.4 % — SIGNIFICANT CHANGE UP (ref 0–2)
BUN SERPL-MCNC: 15 MG/DL — SIGNIFICANT CHANGE UP (ref 7–23)
CALCIUM SERPL-MCNC: 8.5 MG/DL — SIGNIFICANT CHANGE UP (ref 8.5–10.1)
CHLORIDE SERPL-SCNC: 113 MMOL/L — HIGH (ref 96–108)
CO2 SERPL-SCNC: 27 MMOL/L — SIGNIFICANT CHANGE UP (ref 22–31)
CREAT SERPL-MCNC: 1 MG/DL — SIGNIFICANT CHANGE UP (ref 0.5–1.3)
CULTURE RESULTS: SIGNIFICANT CHANGE UP
CULTURE RESULTS: SIGNIFICANT CHANGE UP
EGFR: 76 ML/MIN/1.73M2 — SIGNIFICANT CHANGE UP
EOSINOPHIL # BLD AUTO: 0.26 K/UL — SIGNIFICANT CHANGE UP (ref 0–0.5)
EOSINOPHIL NFR BLD AUTO: 5.3 % — SIGNIFICANT CHANGE UP (ref 0–6)
GLUCOSE SERPL-MCNC: 102 MG/DL — HIGH (ref 70–99)
HCT VFR BLD CALC: 36.8 % — LOW (ref 39–50)
HGB BLD-MCNC: 12.7 G/DL — LOW (ref 13–17)
IMM GRANULOCYTES NFR BLD AUTO: 0.6 % — SIGNIFICANT CHANGE UP (ref 0–0.9)
LYMPHOCYTES # BLD AUTO: 0.59 K/UL — LOW (ref 1–3.3)
LYMPHOCYTES # BLD AUTO: 12.1 % — LOW (ref 13–44)
MAGNESIUM SERPL-MCNC: 1.7 MG/DL — SIGNIFICANT CHANGE UP (ref 1.6–2.6)
MCHC RBC-ENTMCNC: 30.5 PG — SIGNIFICANT CHANGE UP (ref 27–34)
MCHC RBC-ENTMCNC: 34.5 GM/DL — SIGNIFICANT CHANGE UP (ref 32–36)
MCV RBC AUTO: 88.5 FL — SIGNIFICANT CHANGE UP (ref 80–100)
MONOCYTES # BLD AUTO: 0.61 K/UL — SIGNIFICANT CHANGE UP (ref 0–0.9)
MONOCYTES NFR BLD AUTO: 12.5 % — SIGNIFICANT CHANGE UP (ref 2–14)
NEUTROPHILS # BLD AUTO: 3.38 K/UL — SIGNIFICANT CHANGE UP (ref 1.8–7.4)
NEUTROPHILS NFR BLD AUTO: 69.1 % — SIGNIFICANT CHANGE UP (ref 43–77)
NRBC # BLD: 0 /100 WBCS — SIGNIFICANT CHANGE UP (ref 0–0)
PHOSPHATE SERPL-MCNC: 2.5 MG/DL — SIGNIFICANT CHANGE UP (ref 2.5–4.5)
PLATELET # BLD AUTO: 226 K/UL — SIGNIFICANT CHANGE UP (ref 150–400)
POTASSIUM SERPL-MCNC: 3.7 MMOL/L — SIGNIFICANT CHANGE UP (ref 3.5–5.3)
POTASSIUM SERPL-SCNC: 3.7 MMOL/L — SIGNIFICANT CHANGE UP (ref 3.5–5.3)
RBC # BLD: 4.16 M/UL — LOW (ref 4.2–5.8)
RBC # FLD: 12.4 % — SIGNIFICANT CHANGE UP (ref 10.3–14.5)
SODIUM SERPL-SCNC: 143 MMOL/L — SIGNIFICANT CHANGE UP (ref 135–145)
SPECIMEN SOURCE: SIGNIFICANT CHANGE UP
SPECIMEN SOURCE: SIGNIFICANT CHANGE UP
WBC # BLD: 4.89 K/UL — SIGNIFICANT CHANGE UP (ref 3.8–10.5)
WBC # FLD AUTO: 4.89 K/UL — SIGNIFICANT CHANGE UP (ref 3.8–10.5)

## 2022-11-29 PROCEDURE — 99233 SBSQ HOSP IP/OBS HIGH 50: CPT

## 2022-11-29 PROCEDURE — 74018 RADEX ABDOMEN 1 VIEW: CPT | Mod: 26

## 2022-11-29 RX ORDER — SODIUM,POTASSIUM PHOSPHATES 278-250MG
2 POWDER IN PACKET (EA) ORAL ONCE
Refills: 0 | Status: COMPLETED | OUTPATIENT
Start: 2022-11-29 | End: 2022-11-29

## 2022-11-29 RX ADMIN — TAMSULOSIN HYDROCHLORIDE 0.4 MILLIGRAM(S): 0.4 CAPSULE ORAL at 22:08

## 2022-11-29 RX ADMIN — ENOXAPARIN SODIUM 40 MILLIGRAM(S): 100 INJECTION SUBCUTANEOUS at 13:08

## 2022-11-29 RX ADMIN — PANTOPRAZOLE SODIUM 40 MILLIGRAM(S): 20 TABLET, DELAYED RELEASE ORAL at 07:16

## 2022-11-29 RX ADMIN — Medication 2 PACKET(S): at 13:08

## 2022-11-29 RX ADMIN — PIPERACILLIN AND TAZOBACTAM 25 GRAM(S): 4; .5 INJECTION, POWDER, LYOPHILIZED, FOR SOLUTION INTRAVENOUS at 07:16

## 2022-11-29 RX ADMIN — PIPERACILLIN AND TAZOBACTAM 25 GRAM(S): 4; .5 INJECTION, POWDER, LYOPHILIZED, FOR SOLUTION INTRAVENOUS at 13:08

## 2022-11-29 RX ADMIN — SENNA PLUS 2 TABLET(S): 8.6 TABLET ORAL at 22:07

## 2022-11-29 RX ADMIN — Medication 50 MICROGRAM(S): at 22:07

## 2022-11-29 RX ADMIN — PIPERACILLIN AND TAZOBACTAM 25 GRAM(S): 4; .5 INJECTION, POWDER, LYOPHILIZED, FOR SOLUTION INTRAVENOUS at 22:06

## 2022-11-29 RX ADMIN — DONEPEZIL HYDROCHLORIDE 5 MILLIGRAM(S): 10 TABLET, FILM COATED ORAL at 22:07

## 2022-11-29 NOTE — PROGRESS NOTE ADULT - ASSESSMENT
83 y/o M with BPH, hypothyroidism with recent ILR for possible arrhythmia presented from an Urgent Care Ctr for abdominal distention, diffuse bloating, and pain.  He was found to have perforated bowel and is s/p lap to open small bowel resection    Cardiac Optimization/Cardiac Arrhythmia  - p/w abd pain and distention, + bowel perforation, s/p Small bowel resection  POD 6, seen by surgery this am fu abdominal xray possible ileus as per notes  - tolerated well from CV POV   - s/p ILR implantation (2 wks ago) for palpitations.    - EKG showed sinus arrhythmia with no ischemia.    - /54 continue home toprol     - Monitor and replete lytes, keep K>4, Mg>2.  Roxann Garcia FNP-C  Cardiology NP  [facility] [phone]   83 y/o M with BPH, hypothyroidism with recent ILR for possible arrhythmia presented from an Urgent [facility] for abdominal distention, diffuse bloating, and pain.  He was found to have perforated bowel and is s/p lap to open small bowel resection    Cardiac Optimization/Cardiac Arrhythmia  - p/w abd pain and distention, + bowel perforation, s/p Small bowel resection  POD 6, seen by surgery this am fu abdominal x ray possible ileus as per notes- fu recs dw patient he had BM this am   - tolerated well from CV POV    - s/p ILR implantation (2 wks ago) for palpitations.    - EKG showed sinus arrhythmia with no ischemia.    - /54 continue home toprol     - Monitor and replete lytes, keep K>4, Mg>2.  Roxann Garcia FNP-C  Cardiology NP  [facility] [phone]

## 2022-11-29 NOTE — PROGRESS NOTE ADULT - SUBJECTIVE AND OBJECTIVE BOX
[facility] -- Richa Castillo Pannella, Patel, Savella [facility]  Office # [phone]      Follow Up:  cardiac optimization     Subjective/Observations:     No events overnight resting comfortably in bed.  No complaints of chest pain, dyspnea, or palpitations reported. No signs of orthopnea or PND.    REVIEW OF SYSTEMS: All other review of systems is negative unless indicated above    PAST MEDICAL & SURGICAL HISTORY:  Benign non-nodular prostatic hyperplasia, presence of lower urinary tract symptoms unspecified      Memory impairment      Hypothyroid      Cardiac dysrhythmia      H/O right inguinal hernia repair        S/P appendectomy      S/P right inguinal hernia repair  2016, Dr. Parson          MEDICATIONS  (STANDING):  donepezil 5 milliGRAM(s) Oral at bedtime  enoxaparin Injectable 40 milliGRAM(s) SubCutaneous every 24 hours  levothyroxine Injectable 50 MICROGram(s) IV Push at bedtime  metoprolol succinate ER 25 milliGRAM(s) Oral daily  pantoprazole    Tablet 40 milliGRAM(s) Oral before breakfast  piperacillin/tazobactam IVPB.. 3.375 Gram(s) IV Intermittent every 8 hours  senna 2 Tablet(s) Oral at bedtime  tamsulosin 0.4 milliGRAM(s) Oral at bedtime    MEDICATIONS  (PRN):  ondansetron Injectable 4 milliGRAM(s) IV Push every 6 hours PRN Nausea      Allergies    bacitracin (Rash)    Intolerances        Vital Signs Last 24 Hrs  T(C): 36.7 (2022 04:30), Max: 36.7 (2022 04:30)  T(F): 98 (2022 04:30), Max: 98 (2022 04:30)  HR: 55 (2022 07:20) (55 - 65)  BP: 113/54 (2022 04:30) (113/54 - 155/73)  BP(mean): --  RR: 17 (2022 04:30) (17 - 17)  SpO2: 95% (2022 04:30) (93% - 95%)    Parameters below as of 2022 04:30  Patient On (Oxygen Delivery Method): room air        I&O's Summary    2022 07:01  -  2022 07:00  --------------------------------------------------------  IN: 2750 mL / OUT: 1650 mL / NET: 1100 mL          PHYSICAL EXAM:  TELE: not on tele   Constitutional: NAD, awake and alert, well-developed  HEENT: Moist Mucous Membranes, Anicteric  Pulmonary: Non-labored, breath sounds are clear bilaterally, No wheezing, crackles or rhonchi  Cardiovascular: Regular, S1 and S2 nl, No murmurs, rubs, gallops or clicks  Gastrointestinal: Bowel Sounds present, soft, nontender.   Lymph: No lymphadenopathy. No peripheral edema.  Skin: No visible rashes or ulcers.  Psych:  Mood & affect appropriate    LABS: All Labs Reviewed:                        12.7   4.89  )-----------( 226      ( 2022 06:25 )             36.8                         12.9   5.13  )-----------( 212      ( 2022 06:28 )             37.8                         13.3   5.17  )-----------( 194      ( 2022 07:16 )             39.7     2022 06:25    143    |  113    |  15     ----------------------------<  102    3.7     |  27     |  1.00   2022 06:28    143    |  110    |  18     ----------------------------<  109    3.5     |  26     |  1.10   2022 07:16    141    |  108    |  21     ----------------------------<  106    4.1     |  22     |  1.10     Ca    8.5        2022 06:25  Ca    9.1        2022 06:28  Ca    9.4        2022 07:16  Phos  2.5       2022 06:25  Phos  2.1       2022 06:28  Phos  2.0       2022 07:16  Mg     1.7       2022 06:25  Mg     1.9       2022 06:28  Mg     2.0       2022 07:16    TPro  5.4    /  Alb  2.2    /  TBili  0.7    /  DBili  x      /  AST  31     /  ALT  38     /  AlkPhos  70     2022 06:28  TPro  5.7    /  Alb  2.2    /  TBili  0.9    /  DBili  x      /  AST  18     /  ALT  22     /  AlkPhos  64     2022 07:16             EC Lead ECG:   Ventricular Rate 64 BPM    Atrial Rate 64 BPM    P-R Interval 152 ms    QRS Duration 96 ms    Q-T Interval 398 ms    QTC Calculation(Bazett) 410 ms    P Axis 42 degrees    R Axis 37 degrees    T Axis 36 degrees    Diagnosis Line Sinus rhythm with marked sinus arrhythmia  Otherwise normal ECG  When compared with ECG of 2022 11:59, (Unconfirmed)  No significant change was found  Confirmed by kylie Ross ([id number]) on 2022 11:37:28 AM (22 @ 08:41)           [facility] -- Richa Castillo Pannella, Patel, Savella [facility]  Office # [phone]      Follow Up:  cardiac optimization     Subjective/Observations:     No events overnight resting comfortably in bed.  No complaints of chest pain, dyspnea, or palpitations reported. No signs of orthopnea or PND.  reports BM this am     REVIEW OF SYSTEMS: All other review of systems is negative unless indicated above    PAST MEDICAL & SURGICAL HISTORY:  Benign non-nodular prostatic hyperplasia, presence of lower urinary tract symptoms unspecified      Memory impairment      Hypothyroid      Cardiac dysrhythmia      H/O right inguinal hernia repair        S/P appendectomy      S/P right inguinal hernia repair  2016, Dr. Parson          MEDICATIONS  (STANDING):  donepezil 5 milliGRAM(s) Oral at bedtime  enoxaparin Injectable 40 milliGRAM(s) SubCutaneous every 24 hours  levothyroxine Injectable 50 MICROGram(s) IV Push at bedtime  metoprolol succinate ER 25 milliGRAM(s) Oral daily  pantoprazole    Tablet 40 milliGRAM(s) Oral before breakfast  piperacillin/tazobactam IVPB.. 3.375 Gram(s) IV Intermittent every 8 hours  senna 2 Tablet(s) Oral at bedtime  tamsulosin 0.4 milliGRAM(s) Oral at bedtime    MEDICATIONS  (PRN):  ondansetron Injectable 4 milliGRAM(s) IV Push every 6 hours PRN Nausea      Allergies    bacitracin (Rash)    Intolerances        Vital Signs Last 24 Hrs  T(C): 36.7 (2022 04:30), Max: 36.7 (2022 04:30)  T(F): 98 (2022 04:30), Max: 98 (2022 04:30)  HR: 55 (2022 07:20) (55 - 65)  BP: 113/54 (2022 04:30) (113/54 - 155/73)  BP(mean): --  RR: 17 (2022 04:30) (17 - 17)  SpO2: 95% (2022 04:30) (93% - 95%)    Parameters below as of 2022 04:30  Patient On (Oxygen Delivery Method): room air        I&O's Summary    2022 07:01  -  2022 07:00  --------------------------------------------------------  IN: 2750 mL / OUT: 1650 mL / NET: 1100 mL          PHYSICAL EXAM:  TELE: not on tele   Constitutional: NAD, awake and alert, well-developed  HEENT: Moist Mucous Membranes, Anicteric  Pulmonary: Non-labored, breath sounds are clear bilaterally, No wheezing, crackles or rhonchi  Cardiovascular: Regular, S1 and S2 nl, No murmurs, rubs, gallops or clicks  Gastrointestinal: Bowel Sounds present, soft, nontender.   Lymph: No lymphadenopathy. No peripheral edema.  Skin: No visible rashes or ulcers.  Psych:  Mood & affect appropriate    LABS: All Labs Reviewed:                        12.7   4.89  )-----------( 226      ( 2022 06:25 )             36.8                         12.9   5.13  )-----------( 212      ( 2022 06:28 )             37.8                         13.3   5.17  )-----------( 194      ( 2022 07:16 )             39.7     2022 06:25    143    |  113    |  15     ----------------------------<  102    3.7     |  27     |  1.00   2022 06:28    143    |  110    |  18     ----------------------------<  109    3.5     |  26     |  1.10   2022 07:16    141    |  108    |  21     ----------------------------<  106    4.1     |  22     |  1.10     Ca    8.5        2022 06:25  Ca    9.1        2022 06:28  Ca    9.4        2022 07:16  Phos  2.5       2022 06:25  Phos  2.1       2022 06:28  Phos  2.0       2022 07:16  Mg     1.7       2022 06:25  Mg     1.9       2022 06:28  Mg     2.0       2022 07:16    TPro  5.4    /  Alb  2.2    /  TBili  0.7    /  DBili  x      /  AST  31     /  ALT  38     /  AlkPhos  70     2022 06:28  TPro  5.7    /  Alb  2.2    /  TBili  0.9    /  DBili  x      /  AST  18     /  ALT  22     /  AlkPhos  64     2022 07:16             EC Lead ECG:   Ventricular Rate 64 BPM    Atrial Rate 64 BPM    P-R Interval 152 ms    QRS Duration 96 ms    Q-T Interval 398 ms    QTC Calculation(Bazett) 410 ms    P Axis 42 degrees    R Axis 37 degrees    T Axis 36 degrees    Diagnosis Line Sinus rhythm with marked sinus arrhythmia  Otherwise normal ECG  When compared with ECG of 2022 11:59, (Unconfirmed)  No significant change was found  Confirmed by kylie Ross ([id number]) on 2022 11:37:28 AM (22 @ 08:41)

## 2022-11-29 NOTE — PROGRESS NOTE ADULT - SUBJECTIVE AND OBJECTIVE BOX
Patient is a 82y old  Male who presents with a chief complaint of small bowel perforation (29 Nov 2022 07:54)      INTERVAL /OVERNIGHT EVENTS: diet restarted, + BM    MEDICATIONS  (STANDING):  donepezil 5 milliGRAM(s) Oral at bedtime  enoxaparin Injectable 40 milliGRAM(s) SubCutaneous every 24 hours  levothyroxine Injectable 50 MICROGram(s) IV Push at bedtime  metoprolol succinate ER 25 milliGRAM(s) Oral daily  pantoprazole    Tablet 40 milliGRAM(s) Oral before breakfast  piperacillin/tazobactam IVPB.. 3.375 Gram(s) IV Intermittent every 8 hours  senna 2 Tablet(s) Oral at bedtime  tamsulosin 0.4 milliGRAM(s) Oral at bedtime    MEDICATIONS  (PRN):  ondansetron Injectable 4 milliGRAM(s) IV Push every 6 hours PRN Nausea      Allergies    bacitracin (Rash)    Intolerances        REVIEW OF SYSTEMS:  CONSTITUTIONAL: No fever, weight loss, or fatigue  EYES: No eye pain, visual disturbances, or discharge  ENMT:  No difficulty hearing, tinnitus, vertigo; No sinus or throat pain  NECK: No pain or stiffness  RESPIRATORY: No cough, wheezing, chills or hemoptysis; No shortness of breath  CARDIOVASCULAR: No chest pain, palpitations, dizziness, or leg swelling  GASTROINTESTINAL: No abdominal or epigastric pain. No nausea, vomiting, or hematemesis; No diarrhea or constipation. No melena or hematochezia.  GENITOURINARY: No dysuria, frequency, hematuria, or incontinence  NEUROLOGICAL: No headaches, memory loss, loss of strength, numbness, or tremors  SKIN: No itching, burning, rashes, or lesions   LYMPH NODES: No enlarged glands  ENDOCRINE: No heat or cold intolerance; No hair loss; No polydipsia or polyuria  MUSCULOSKELETAL: No joint pain or swelling; No muscle, back, or extremity pain  PSYCHIATRIC: No depression, anxiety, mood swings, or difficulty sleeping  HEME/LYMPH: No easy bruising, or bleeding gums  ALLERGY AND IMMUNOLOGIC: No hives or eczema    Vital Signs Last 24 Hrs  T(C): 36.4 (29 Nov 2022 12:22), Max: 36.7 (29 Nov 2022 04:30)  T(F): 97.5 (29 Nov 2022 12:22), Max: 98 (29 Nov 2022 04:30)  HR: 58 (29 Nov 2022 12:22) (55 - 65)  BP: 137/76 (29 Nov 2022 12:22) (113/54 - 155/73)  BP(mean): --  RR: 18 (29 Nov 2022 12:22) (17 - 18)  SpO2: 94% (29 Nov 2022 12:22) (93% - 95%)    Parameters below as of 29 Nov 2022 12:22  Patient On (Oxygen Delivery Method): room air        PHYSICAL EXAM:  GENERAL: NAD, well-groomed, well-developed  HEAD:  Atraumatic, Normocephalic  EYES: EOMI, PERRLA, conjunctiva and sclera clear  ENMT: No tonsillar erythema, exudates, or enlargement; Moist mucous membranes, Good dentition, No lesions  NECK: Supple, No JVD, Normal thyroid  NERVOUS SYSTEM:  Alert & Oriented X3, Good concentration; Motor Strength 5/5 B/L upper and lower extremities; DTRs 2+ intact and symmetric  CHEST/LUNG: Clear to auscultation bilaterally; No rales, rhonchi, wheezing, or rubs  HEART: Regular rate and rhythm; No murmurs, rubs, or gallops  ABDOMEN: Soft, Nontender, Nondistended; Bowel sounds present  EXTREMITIES:  2+ Peripheral Pulses, No clubbing, cyanosis, or edema  LYMPH: No lymphadenopathy noted  SKIN: No rashes or lesions    LABS:                        12.7   4.89  )-----------( 226      ( 29 Nov 2022 06:25 )             36.8     29 Nov 2022 06:25    143    |  113    |  15     ----------------------------<  102    3.7     |  27     |  1.00     Ca    8.5        29 Nov 2022 06:25  Phos  2.5       29 Nov 2022 06:25  Mg     1.7       29 Nov 2022 06:25          CAPILLARY BLOOD GLUCOSE          RADIOLOGY & ADDITIONAL TESTS:    Notes Reviewed:  [x ] YES  [ ] NO    Care Discussed with Consultants/Other Providers [ x] YES  [ ] NO

## 2022-11-29 NOTE — PROGRESS NOTE ADULT - ASSESSMENT
Mr. Cantu is a 82 yoM with cognitive decline, bph, cardiac arrhythmia, and hypothyroidism who presented with small bowel perforation and is s/p small bowel resection on 11/23. NGT removed on 11/25. Postoperatively patient is doing well but waiting for return of bowel function. Pt has passed flatus. Pain is controlled. KUB yesterday showed distended loops of bowel. Likely ileus.    Plan:  - Abdominal x-ray  - Possible gastrograffin challenge   - OOB  - CLD  -  IS  - DVT PPX  - Continue home meds  - Replete labs prn    Case discussed with Dr. Chiu    Dispo: pending return of bowel function Mr. Cantu is a 82 yoM with cognitive decline, bph, cardiac arrhythmia, and hypothyroidism who presented with small bowel perforation and is s/p small bowel resection on 11/23. NGT removed on 11/25. Postoperatively patient is doing well but waiting for return of bowel function. Pt has passed flatus. Pain is controlled. KUB yesterday showed distended loops of bowel. Likely ileus.    Plan:  - Abdominal x-ray  - Possible gastrograffin challenge   - OOB  - CLD  -  IS  - DVT PPX  - Continue home meds  - Replete labs prn    Case discussed with Dr. Chiu    Dispo: pending return of bowel function    Surg. Att.  Pt . had a large BM this Am and his diet will be advanced.

## 2022-11-29 NOTE — PROGRESS NOTE ADULT - SUBJECTIVE AND OBJECTIVE BOX
HPI/OVERNIGHT EVENTS:  Patient seen and examined at bedside. NAEO. Patient was tolerating cld yesterday but made npo overnight for concern of ileus. Pt is passing flatus. Denies f/c/n/v/sob/chest pain. Pt states he is walking.    MEDICATIONS  (STANDING):  donepezil 5 milliGRAM(s) Oral at bedtime  enoxaparin Injectable 40 milliGRAM(s) SubCutaneous every 24 hours  levothyroxine Injectable 50 MICROGram(s) IV Push at bedtime  metoprolol succinate ER 25 milliGRAM(s) Oral daily  pantoprazole    Tablet 40 milliGRAM(s) Oral before breakfast  piperacillin/tazobactam IVPB.. 3.375 Gram(s) IV Intermittent every 8 hours  senna 2 Tablet(s) Oral at bedtime  sodium chloride 0.9%. 1000 milliLiter(s) (100 mL/Hr) IV Continuous <Continuous>  tamsulosin 0.4 milliGRAM(s) Oral at bedtime    MEDICATIONS  (PRN):  ondansetron Injectable 4 milliGRAM(s) IV Push every 6 hours PRN Nausea      Vital Signs Last 24 Hrs  T(C): 36.7 (29 Nov 2022 04:30), Max: 36.7 (29 Nov 2022 04:30)  T(F): 98 (29 Nov 2022 04:30), Max: 98 (29 Nov 2022 04:30)  HR: 55 (29 Nov 2022 07:20) (55 - 65)  BP: 113/54 (29 Nov 2022 04:30) (113/54 - 155/73)  BP(mean): --  RR: 17 (29 Nov 2022 04:30) (17 - 17)  SpO2: 95% (29 Nov 2022 04:30) (93% - 95%)    Parameters below as of 29 Nov 2022 04:30  Patient On (Oxygen Delivery Method): room air        Constitutional: patient resting comfortably in bed, in no acute distress  HEENT: EOMI /glasses, atraumatic  Neck: No JVD   Respiratory: respirations are unlabored, no accessory muscle use, normal work of breathing on RA  Gastrointestinal: Abdomen soft, non-tender, decreasing distention, incision c/d/i, no rebound tenderness / guarding  Neurological: GCS: 15 (4/5/6).       I&O's Detail  28 Nov 2022 07:01  -  29 Nov 2022 07:00  --------------------------------------------------------  IN:    IV PiggyBack: 50 mL    IV PiggyBack: 300 mL    Oral Fluid: 500 mL    sodium chloride 0.45%: 300 mL    sodium chloride 0.9%: 1600 mL  Total IN: 2750 mL    OUT:    Voided (mL): 1650 mL  Total OUT: 1650 mL    Total NET: 1100 mL          LABS:             12.9   5.13  )-----------( 212      ( 28 Nov 2022 06:28 )             37.8     11-28    143  |  110<H>  |  18  ----------------------------<  109<H>  3.5   |  26  |  1.10    Ca    9.1      28 Nov 2022 06:28  Phos  2.1     11-28  Mg     1.9     11-28    TPro  5.4<L>  /  Alb  2.2<L>  /  TBili  0.7  /  DBili  x   /  AST  31  /  ALT  38  /  AlkPhos  70  11-28

## 2022-11-30 ENCOUNTER — TRANSCRIPTION ENCOUNTER (OUTPATIENT)
Age: 82
End: 2022-11-30

## 2022-11-30 LAB
ANION GAP SERPL CALC-SCNC: 6 MMOL/L — SIGNIFICANT CHANGE UP (ref 5–17)
BASOPHILS # BLD AUTO: 0.05 K/UL — SIGNIFICANT CHANGE UP (ref 0–0.2)
BASOPHILS NFR BLD AUTO: 0.8 % — SIGNIFICANT CHANGE UP (ref 0–2)
BUN SERPL-MCNC: 14 MG/DL — SIGNIFICANT CHANGE UP (ref 7–23)
CALCIUM SERPL-MCNC: 8.8 MG/DL — SIGNIFICANT CHANGE UP (ref 8.5–10.1)
CHLORIDE SERPL-SCNC: 112 MMOL/L — HIGH (ref 96–108)
CO2 SERPL-SCNC: 26 MMOL/L — SIGNIFICANT CHANGE UP (ref 22–31)
CREAT SERPL-MCNC: 0.99 MG/DL — SIGNIFICANT CHANGE UP (ref 0.5–1.3)
EGFR: 77 ML/MIN/1.73M2 — SIGNIFICANT CHANGE UP
EOSINOPHIL # BLD AUTO: 0.35 K/UL — SIGNIFICANT CHANGE UP (ref 0–0.5)
EOSINOPHIL NFR BLD AUTO: 5.6 % — SIGNIFICANT CHANGE UP (ref 0–6)
GLUCOSE SERPL-MCNC: 96 MG/DL — SIGNIFICANT CHANGE UP (ref 70–99)
HCT VFR BLD CALC: 37.7 % — LOW (ref 39–50)
HGB BLD-MCNC: 12.7 G/DL — LOW (ref 13–17)
IMM GRANULOCYTES NFR BLD AUTO: 0.8 % — SIGNIFICANT CHANGE UP (ref 0–0.9)
LYMPHOCYTES # BLD AUTO: 0.65 K/UL — LOW (ref 1–3.3)
LYMPHOCYTES # BLD AUTO: 10.4 % — LOW (ref 13–44)
MAGNESIUM SERPL-MCNC: 1.7 MG/DL — SIGNIFICANT CHANGE UP (ref 1.6–2.6)
MCHC RBC-ENTMCNC: 30 PG — SIGNIFICANT CHANGE UP (ref 27–34)
MCHC RBC-ENTMCNC: 33.7 GM/DL — SIGNIFICANT CHANGE UP (ref 32–36)
MCV RBC AUTO: 88.9 FL — SIGNIFICANT CHANGE UP (ref 80–100)
MONOCYTES # BLD AUTO: 0.9 K/UL — SIGNIFICANT CHANGE UP (ref 0–0.9)
MONOCYTES NFR BLD AUTO: 14.4 % — HIGH (ref 2–14)
NEUTROPHILS # BLD AUTO: 4.24 K/UL — SIGNIFICANT CHANGE UP (ref 1.8–7.4)
NEUTROPHILS NFR BLD AUTO: 68 % — SIGNIFICANT CHANGE UP (ref 43–77)
NRBC # BLD: 0 /100 WBCS — SIGNIFICANT CHANGE UP (ref 0–0)
PHOSPHATE SERPL-MCNC: 2.5 MG/DL — SIGNIFICANT CHANGE UP (ref 2.5–4.5)
PLATELET # BLD AUTO: 244 K/UL — SIGNIFICANT CHANGE UP (ref 150–400)
POTASSIUM SERPL-MCNC: 3.7 MMOL/L — SIGNIFICANT CHANGE UP (ref 3.5–5.3)
POTASSIUM SERPL-SCNC: 3.7 MMOL/L — SIGNIFICANT CHANGE UP (ref 3.5–5.3)
RBC # BLD: 4.24 M/UL — SIGNIFICANT CHANGE UP (ref 4.2–5.8)
RBC # FLD: 12.3 % — SIGNIFICANT CHANGE UP (ref 10.3–14.5)
SODIUM SERPL-SCNC: 144 MMOL/L — SIGNIFICANT CHANGE UP (ref 135–145)
WBC # BLD: 6.24 K/UL — SIGNIFICANT CHANGE UP (ref 3.8–10.5)
WBC # FLD AUTO: 6.24 K/UL — SIGNIFICANT CHANGE UP (ref 3.8–10.5)

## 2022-11-30 PROCEDURE — 99232 SBSQ HOSP IP/OBS MODERATE 35: CPT

## 2022-11-30 RX ORDER — LEVOTHYROXINE SODIUM 125 MCG
100 TABLET ORAL DAILY
Refills: 0 | Status: DISCONTINUED | OUTPATIENT
Start: 2022-11-30 | End: 2022-12-01

## 2022-11-30 RX ADMIN — SENNA PLUS 2 TABLET(S): 8.6 TABLET ORAL at 21:12

## 2022-11-30 RX ADMIN — PANTOPRAZOLE SODIUM 40 MILLIGRAM(S): 20 TABLET, DELAYED RELEASE ORAL at 05:30

## 2022-11-30 RX ADMIN — PIPERACILLIN AND TAZOBACTAM 25 GRAM(S): 4; .5 INJECTION, POWDER, LYOPHILIZED, FOR SOLUTION INTRAVENOUS at 21:12

## 2022-11-30 RX ADMIN — Medication 25 MILLIGRAM(S): at 05:31

## 2022-11-30 RX ADMIN — ENOXAPARIN SODIUM 40 MILLIGRAM(S): 100 INJECTION SUBCUTANEOUS at 12:00

## 2022-11-30 RX ADMIN — PIPERACILLIN AND TAZOBACTAM 25 GRAM(S): 4; .5 INJECTION, POWDER, LYOPHILIZED, FOR SOLUTION INTRAVENOUS at 05:30

## 2022-11-30 RX ADMIN — DONEPEZIL HYDROCHLORIDE 5 MILLIGRAM(S): 10 TABLET, FILM COATED ORAL at 21:12

## 2022-11-30 RX ADMIN — TAMSULOSIN HYDROCHLORIDE 0.4 MILLIGRAM(S): 0.4 CAPSULE ORAL at 21:12

## 2022-11-30 RX ADMIN — PIPERACILLIN AND TAZOBACTAM 25 GRAM(S): 4; .5 INJECTION, POWDER, LYOPHILIZED, FOR SOLUTION INTRAVENOUS at 15:02

## 2022-11-30 NOTE — DISCHARGE NOTE PROVIDER - NSDCMRMEDTOKEN_GEN_ALL_CORE_FT
donepezil 5 mg oral tablet: 1 tab(s) orally once a day (at bedtime)  metoprolol succinate 25 mg oral tablet, extended release: 1 tab(s) orally once a day  Synthroid 100 mcg (0.1 mg) oral tablet: 1 tab(s) orally once a day  tamsulosin 0.4 mg oral capsule: 1 cap(s) orally once a day   No adenopathy or splenomegaly. No cervical or inguinal lymphadenopathy.

## 2022-11-30 NOTE — DISCHARGE NOTE PROVIDER - HOSPITAL COURSE
Mr. Cantu is an 81 yo M with a past medical history of cognitive decline, idiopathic rash, BPH, cardiac arrhythmia, jejunal diverticulum and hypothyroidism who presented to the ED on 11/23 with three days of abdominal pain and distention. He was found to have a bowel perforation on CT with labs significant for an elevated WBC, t bili,  and troponin. He was prepped for the OR and underwent a diagnostic laparoscopy with a small bowel resection. His post operative course was complicated by an ileus. He was treated appropriately and now has return of bowel function. He is tolerating a regular diet, he is hemodynamically stable and his vitals are stable.    Dispo: home Mr. Cantu is an 81 yo M with a past medical history of cognitive decline, idiopathic rash, BPH, cardiac arrhythmia, jejunal diverticulum and hypothyroidism who presented to the ED on 11/23 with three days of abdominal pain and distention. He was found to have a bowel perforation on CT with labs significant for an elevated WBC, t bili,  and troponin. He was prepped for the OR and underwent a diagnostic laparoscopy with a small bowel resection. His post operative course was complicated by an ileus. He was treated appropriately and now has return of bowel function. He is tolerating a regular diet, he is hemodynamically stable and his vitals are stable.    Dispo: home 12/1

## 2022-11-30 NOTE — DISCHARGE NOTE PROVIDER - NSDCCPCAREPLAN_GEN_ALL_CORE_FT
PRINCIPAL DISCHARGE DIAGNOSIS  Diagnosis: Bowel perforation  Assessment and Plan of Treatment:

## 2022-11-30 NOTE — PROGRESS NOTE ADULT - ASSESSMENT
83 y/o M with BPH, hypothyroidism with recent ILR for possible arrhythmia presented from an [facility] for abdominal distention, diffuse bloating, and pain.  He was found to have perforated bowel and is s/p lap to open small bowel resection    Cardiac Optimization/Cardiac Arrhythmia  - p/w abd pain and distention, + bowel perforation, s/p Small bowel resection    - tolerated well from CV POV    - s/p ILR implantation (2 wks ago) for palpitations.    - EKG showed sinus arrhythmia with no ischemia.      dc as per surgical team     - Monitor and replete lytes, keep K>4, Mg>2.  Roxann Garcia FNP-C  Cardiology NP  [id number] [phone]

## 2022-11-30 NOTE — PROGRESS NOTE ADULT - SUBJECTIVE AND OBJECTIVE BOX
[facility] -- Richa Castillo Pannella, Patel, Savella [facility]  Office # [phone]      Follow Up:    cardiac optimization   Subjective/Observations:     No events overnight resting comfortably in bed.  No complaints of chest pain, dyspnea, or palpitations reported. No signs of orthopnea or PND.    REVIEW OF SYSTEMS: All other review of systems is negative unless indicated above    PAST MEDICAL & SURGICAL HISTORY:  Benign non-nodular prostatic hyperplasia, presence of lower urinary tract symptoms unspecified      Memory impairment      Hypothyroid      Cardiac dysrhythmia      H/O right inguinal hernia repair        S/P appendectomy      S/P right inguinal hernia repair  2016, Dr. Parson          MEDICATIONS  (STANDING):  donepezil 5 milliGRAM(s) Oral at bedtime  enoxaparin Injectable 40 milliGRAM(s) SubCutaneous every 24 hours  levothyroxine Injectable 50 MICROGram(s) IV Push at bedtime  metoprolol succinate ER 25 milliGRAM(s) Oral daily  pantoprazole    Tablet 40 milliGRAM(s) Oral before breakfast  piperacillin/tazobactam IVPB.. 3.375 Gram(s) IV Intermittent every 8 hours  senna 2 Tablet(s) Oral at bedtime  tamsulosin 0.4 milliGRAM(s) Oral at bedtime    MEDICATIONS  (PRN):  ondansetron Injectable 4 milliGRAM(s) IV Push every 6 hours PRN Nausea      Allergies    bacitracin (Rash)    Intolerances        Vital Signs Last 24 Hrs  T(C): 36.5 (2022 05:32), Max: 36.6 (2022 20:46)  T(F): 97.7 (2022 05:32), Max: 97.8 (2022 20:46)  HR: 47 (2022 05:32) (47 - 63)  BP: 150/84 (2022 05:32) (128/71 - 150/84)  BP(mean): --  RR: 18 (2022 05:32) (17 - 18)  SpO2: 96% (2022 05:32) (94% - 96%)    Parameters below as of 2022 05:32  Patient On (Oxygen Delivery Method): room air        I&O's Summary    2022 07:01  -  2022 07:00  --------------------------------------------------------  IN: 0 mL / OUT: 1000 mL / NET: -1000 mL          PHYSICAL EXAM:  TELE: not on tele   Constitutional: NAD, awake and alert, well-developed  HEENT: Moist Mucous Membranes, Anicteric  Pulmonary: Non-labored, breath sounds are clear bilaterally, No wheezing, crackles or rhonchi  Cardiovascular: Regular, S1 and S2 nl, No murmurs, rubs, gallops or clicks  Gastrointestinal: Bowel Sounds present, soft, nontender.   Lymph: No lymphadenopathy. No peripheral edema.  Skin: No visible rashes or ulcers.  Psych:  Mood & affect appropriate    LABS: All Labs Reviewed:                        12.7   6.24  )-----------( 244      ( 2022 06:38 )             37.7                         12.7   4.89  )-----------( 226      ( 2022 06:25 )             36.8                         12.9   5.13  )-----------( 212      ( 2022 06:28 )             37.8     2022 06:25    143    |  113    |  15     ----------------------------<  102    3.7     |  27     |  1.00   2022 06:28    143    |  110    |  18     ----------------------------<  109    3.5     |  26     |  1.10     Ca    8.5        2022 06:25  Ca    9.1        2022 06:28  Phos  2.5       2022 06:25  Phos  2.1       2022 06:28  Mg     1.7       2022 06:25  Mg     1.9       2022 06:28    TPro  5.4    /  Alb  2.2    /  TBili  0.7    /  DBili  x      /  AST  31     /  ALT  38     /  AlkPhos  70     2022 06:28             EC Lead ECG:   Ventricular Rate 64 BPM    Atrial Rate 64 BPM    P-R Interval 152 ms    QRS Duration 96 ms    Q-T Interval 398 ms    QTC Calculation(Bazett) 410 ms    P Axis 42 degrees    R Axis 37 degrees    T Axis 36 degrees    Diagnosis Line Sinus rhythm with marked sinus arrhythmia  Otherwise normal ECG  When compared with ECG of 2022 11:59, (Unconfirmed)  No significant change was found  Confirmed by kylie Ross ([id number]) on 2022 11:37:28 AM (22 @ 08:41)        Radiology:

## 2022-11-30 NOTE — PROGRESS NOTE ADULT - ASSESSMENT
Mr. Cantu is a 82 yoM with cognitive decline, bph, cardiac arrhythmia, and hypothyroidism who presented with small bowel perforation and is s/p small bowel resection on 11/23. NGT removed on 11/25. Pt has passed flatus and reports that he had a bowel movement overnight. Pain is controlled.     Plan:   - OOB  - FLD consider advancing diet this morning   -  IS  - DVT PPX  - Continue home meds  - Replete labs prn  - dispo planning/consider dc in PM   - will discuss with Dr. Chiu  Mr. Cantu is a 82 yoM with cognitive decline, bph, cardiac arrhythmia, and hypothyroidism who presented with small bowel perforation and is s/p small bowel resection on 11/23. NGT removed on 11/25. Pt has passed flatus and reports that he had a bowel movement overnight. Pain is controlled.     Plan:   - OOB  - FLD consider advancing diet this morning   -  IS  - DVT PPX  - Continue home meds  - Replete labs prn  - dispo planning/consider dc in PM   - will discuss with Dr. Chiu   Surg. Att.  Pt's diet will be advanced and he possibly will go home today.  He should not take cyclosporines and see derm. to start alternate meds. Mr. Cantu is a 82 yoM with cognitive decline, bph, cardiac arrhythmia, and hypothyroidism who presented with small bowel perforation and is s/p small bowel resection on 11/23. NGT removed on 11/25. Pt has passed flatus and reports that he had a bowel movement overnight. Pain is controlled.     Plan:   - OOB  - FLD consider advancing diet this morning   -  IS  - DVT PPX  - Continue home meds  - Replete labs prn  - dispo planning/consider dc in PM   - will discuss with Dr. Chiu   Surg. Att.  Pt's diet will be advanced and he possibly will go home today.  He should not take cyclosporines and see derm. to start alternate meds.  Will observe diet tolerance.

## 2022-11-30 NOTE — PROGRESS NOTE ADULT - SUBJECTIVE AND OBJECTIVE BOX
Patient is a 82y old  Male who presents with a chief complaint of small bowel perforation (30 Nov 2022 09:10)  feels well, without complaints  tolerating po diet      INTERVAL HPI/OVERNIGHT EVENTS:  T(C): 35.3 (11-30-22 @ 13:20), Max: 36.6 (11-29-22 @ 20:46)  HR: 59 (11-30-22 @ 13:20) (47 - 63)  BP: 125/63 (11-30-22 @ 13:20) (125/63 - 150/84)  RR: 17 (11-30-22 @ 13:20) (17 - 18)  SpO2: 95% (11-30-22 @ 13:20) (94% - 96%)  Wt(kg): --  I&O's Summary    29 Nov 2022 07:01  -  30 Nov 2022 07:00  --------------------------------------------------------  IN: 0 mL / OUT: 1000 mL / NET: -1000 mL        LABS:                        12.7   6.24  )-----------( 244      ( 30 Nov 2022 06:38 )             37.7     11-30    144  |  112<H>  |  14  ----------------------------<  96  3.7   |  26  |  0.99    Ca    8.8      30 Nov 2022 06:38  Phos  2.5     11-30  Mg     1.7     11-30          CAPILLARY BLOOD GLUCOSE                MEDICATIONS  (STANDING):  donepezil 5 milliGRAM(s) Oral at bedtime  enoxaparin Injectable 40 milliGRAM(s) SubCutaneous every 24 hours  levothyroxine 100 MICROGram(s) Oral daily  metoprolol succinate ER 25 milliGRAM(s) Oral daily  pantoprazole    Tablet 40 milliGRAM(s) Oral before breakfast  piperacillin/tazobactam IVPB.. 3.375 Gram(s) IV Intermittent every 8 hours  senna 2 Tablet(s) Oral at bedtime  tamsulosin 0.4 milliGRAM(s) Oral at bedtime    MEDICATIONS  (PRN):  ondansetron Injectable 4 milliGRAM(s) IV Push every 6 hours PRN Nausea      REVIEW OF SYSTEMS:  CONSTITUTIONAL: No fever, weight loss, or fatigue  EYES: No eye pain, visual disturbances, or discharge  ENMT:  No difficulty hearing, tinnitus, vertigo; No sinus or throat pain  NECK: No pain or stiffness  RESPIRATORY: No cough, wheezing, chills or hemoptysis; No shortness of breath  CARDIOVASCULAR: No chest pain, palpitations, dizziness, or leg swelling  GASTROINTESTINAL: No abdominal or epigastric pain. No nausea, vomiting, or hematemesis; No diarrhea or constipation. No melena or hematochezia.  GENITOURINARY: No dysuria, frequency, hematuria, or incontinence  NEUROLOGICAL: No headaches, memory loss, loss of strength, numbness, or tremors  SKIN: No itching, burning, rashes, or lesions   LYMPH NODES: No enlarged glands  ENDOCRINE: No heat or cold intolerance; No hair loss  MUSCULOSKELETAL: No joint pain or swelling; No muscle, back, or extremity pain  PSYCHIATRIC: No depression, anxiety, mood swings, or difficulty sleeping  HEME/LYMPH: No easy bruising, or bleeding gums  ALLERY AND IMMUNOLOGIC: No hives or eczema    RADIOLOGY & ADDITIONAL TESTS:    Imaging Personally Reviewed:  [x] YES  [ ] NO    Consultant(s) Notes Reviewed:  [x] YES  [ ] NO    PHYSICAL EXAM:  GENERAL: NAD, well-groomed, well-developed  HEAD:  Atraumatic, Normocephalic  EYES: EOMI, PERRLA, conjunctiva and sclera clear  ENMT: No tonsillar erythema, exudates, or enlargement; Moist mucous membranes, Good dentition, No lesions  NECK: Supple, No JVD, Normal thyroid  NERVOUS SYSTEM:  Alert & Oriented X3, Good concentration; Motor Strength 5/5 B/L upper and lower extremities; DTRs 2+ intact and symmetric  CHEST/LUNG: Clear to percussion bilaterally; No rales, rhonchi, wheezing, or rubs  HEART: Regular rate and rhythm; No murmurs, rubs, or gallops  ABDOMEN: Soft, Nontender, Nondistended; Bowel sounds present  EXTREMITIES:  2+ Peripheral Pulses, No clubbing, cyanosis, or edema  LYMPH: No lymphadenopathy noted  SKIN: No rashes or lesions    Care Discussed with Consultants/Other Providers [x ] YES  [ ] NO    advance care planning and advance directives discussed, including but not limited to long term care planning, and all forms reviewed [x]YES

## 2022-11-30 NOTE — PROGRESS NOTE ADULT - SUBJECTIVE AND OBJECTIVE BOX
DARYN, reports some discomfort after eating last night    POD #7: lap assisted SBR for perf'd diverticulitis c/b ileus     SUBJECTIVE:  Patient is doing well this morning, seen and examined at bedside, denies any new complaints. Denies any nausea, vomiting, chest pain, shortness of breath, calf tenderness, fever or chills. Reports +bm/+f, last one overnight. Tolerating diet, ambulating as tolerated.    MEDICATIONS  (STANDING):  donepezil 5 milliGRAM(s) Oral at bedtime  enoxaparin Injectable 40 milliGRAM(s) SubCutaneous every 24 hours  levothyroxine Injectable 50 MICROGram(s) IV Push at bedtime  metoprolol succinate ER 25 milliGRAM(s) Oral daily  pantoprazole    Tablet 40 milliGRAM(s) Oral before breakfast  piperacillin/tazobactam IVPB.. 3.375 Gram(s) IV Intermittent every 8 hours  senna 2 Tablet(s) Oral at bedtime  tamsulosin 0.4 milliGRAM(s) Oral at bedtime    MEDICATIONS  (PRN):  ondansetron Injectable 4 milliGRAM(s) IV Push every 6 hours PRN Nausea      Vital Signs Last 24 Hrs  T(C): 36.5 (30 Nov 2022 05:32), Max: 36.6 (29 Nov 2022 20:46)  T(F): 97.7 (30 Nov 2022 05:32), Max: 97.8 (29 Nov 2022 20:46)  HR: 47 (30 Nov 2022 05:32) (47 - 63)  BP: 150/84 (30 Nov 2022 05:32) (128/71 - 150/84)  BP(mean): --  RR: 18 (30 Nov 2022 05:32) (17 - 18)  SpO2: 96% (30 Nov 2022 05:32) (94% - 96%)    Parameters below as of 30 Nov 2022 05:32  Patient On (Oxygen Delivery Method): room air        PHYSICAL EXAM:  Constitutional: AAOx3, no acute distress  HEENT: NCAT, airway patent  Cardiovascular: RRR, pulses present bilaterally  Respiratory: nonlabored breathing  Gastrointestinal: abdomen soft, nontender, non distended, no rebound or guarding, incisions are c/d/i  Neuro: no focal deficits    I&O's Detail    28 Nov 2022 07:01  -  29 Nov 2022 07:00  --------------------------------------------------------  IN:    IV PiggyBack: 50 mL    IV PiggyBack: 300 mL    Oral Fluid: 500 mL    sodium chloride 0.45%: 300 mL    sodium chloride 0.9%: 1600 mL  Total IN: 2750 mL    OUT:    Voided (mL): 1650 mL  Total OUT: 1650 mL    Total NET: 1100 mL      29 Nov 2022 07:01  -  30 Nov 2022 06:48  --------------------------------------------------------  IN:  Total IN: 0 mL    OUT:    Voided (mL): 1000 mL  Total OUT: 1000 mL    Total NET: -1000 mL          LABS:  [pending]             RADIOLOGY & ADDITIONAL STUDIES: DARYN, reports some discomfort after eating last night    POD #7: lap assisted SBR for perf'd diverticulitis c/b ileus     SUBJECTIVE:  Patient is doing well this morning, seen and examined at bedside, denies any new complaints. Denies any nausea, vomiting, chest pain, shortness of breath, calf tenderness, fever or chills. Reports +bm/+f, last one overnight. Tolerating diet, ambulating as tolerated.    MEDICATIONS  (STANDING):  donepezil 5 milliGRAM(s) Oral at bedtime  enoxaparin Injectable 40 milliGRAM(s) SubCutaneous every 24 hours  levothyroxine Injectable 50 MICROGram(s) IV Push at bedtime  metoprolol succinate ER 25 milliGRAM(s) Oral daily  pantoprazole    Tablet 40 milliGRAM(s) Oral before breakfast  piperacillin/tazobactam IVPB.. 3.375 Gram(s) IV Intermittent every 8 hours  senna 2 Tablet(s) Oral at bedtime  tamsulosin 0.4 milliGRAM(s) Oral at bedtime    MEDICATIONS  (PRN):  ondansetron Injectable 4 milliGRAM(s) IV Push every 6 hours PRN Nausea      Vital Signs Last 24 Hrs  T(C): 36.5 (30 Nov 2022 05:32), Max: 36.6 (29 Nov 2022 20:46)  T(F): 97.7 (30 Nov 2022 05:32), Max: 97.8 (29 Nov 2022 20:46)  HR: 47 (30 Nov 2022 05:32) (47 - 63)  BP: 150/84 (30 Nov 2022 05:32) (128/71 - 150/84)  BP(mean): --  RR: 18 (30 Nov 2022 05:32) (17 - 18)  SpO2: 96% (30 Nov 2022 05:32) (94% - 96%)    Parameters below as of 30 Nov 2022 05:32  Patient On (Oxygen Delivery Method): room air        PHYSICAL EXAM:  Constitutional: AAOx3, no acute distress  HEENT: NCAT, airway patent  Cardiovascular: RRR, pulses present bilaterally  Respiratory: nonlabored breathing  Gastrointestinal: abdomen soft, nontender, non distended, no rebound or guarding, incisions are c/d/i  Neuro: no focal deficits    I&O's Detail    28 Nov 2022 07:01  -  29 Nov 2022 07:00  --------------------------------------------------------  IN:    IV PiggyBack: 50 mL    IV PiggyBack: 300 mL    Oral Fluid: 500 mL    sodium chloride 0.45%: 300 mL    sodium chloride 0.9%: 1600 mL  Total IN: 2750 mL    OUT:    Voided (mL): 1650 mL  Total OUT: 1650 mL    Total NET: 1100 mL      29 Nov 2022 07:01  -  30 Nov 2022 06:48  --------------------------------------------------------  IN:  Total IN: 0 mL    OUT:    Voided (mL): 1000 mL  Total OUT: 1000 mL    Total NET: -1000 mL          LABS:  CBC Full  -  ( 30 Nov 2022 06:38 )  WBC Count : 6.24 K/uL  RBC Count : 4.24 M/uL  Hemoglobin : 12.7 g/dL  Hematocrit : 37.7 %  Platelet Count - Automated : 244 K/uL  Mean Cell Volume : 88.9 fl  Mean Cell Hemoglobin : 30.0 pg  Mean Cell Hemoglobin Concentration : 33.7 gm/dL  Auto Neutrophil # : 4.24 K/uL  Auto Lymphocyte # : 0.65 K/uL  Auto Monocyte # : 0.90 K/uL  Auto Eosinophil # : 0.35 K/uL  Auto Basophil # : 0.05 K/uL  Auto Neutrophil % : 68.0 %  Auto Lymphocyte % : 10.4 %  Auto Monocyte % : 14.4 %  Auto Eosinophil % : 5.6 %  Auto Basophil % : 0.8 %    11-30    144  |  112<H>  |  14  ----------------------------<  96  3.7   |  26  |  0.99    Ca    8.8      30 Nov 2022 06:38  Phos  2.5     11-30  Mg     1.7     11-30        CAPILLARY BLOOD GLUCOSE                       RADIOLOGY & ADDITIONAL STUDIES:

## 2022-11-30 NOTE — PROGRESS NOTE ADULT - ASSESSMENT
Mr. Cantu is an 82 yoM with pmhx of cognitive decline, idiopathic rash, BPH, cardiac arrhythmia, jejunal diverticulum, and hypothyroidism who presents with three days of abdominal pain and distention being admitted for bowel perforation.    #Small bowel Perforation  #Arrythmia  #Hypothyroidism  #BPH      -Cardiology evaluation for history of arrythmia and recent loop recorder placement. EKG with sinus arrythmia, no ischemic changes noted  -Can continue metoprolol post-op  -Consider ID consult to help with antibiotic management post-op  -Continue synthroid, may need to transition to IV as patient likely will be NPO post procedure  -Patient requires emergent surgery- patient is intermediate risk for intermediate-high risk procedure and is medically optimized for emergent procedure  -Plan of care discussed with patient, wife, Surgery, Cardiology

## 2022-11-30 NOTE — DISCHARGE NOTE PROVIDER - NSDCCPTREATMENT_GEN_ALL_CORE_FT
PRINCIPAL PROCEDURE  Procedure: Small bowel resection with anastomosis  Findings and Treatment:

## 2022-11-30 NOTE — DISCHARGE NOTE PROVIDER - CARE PROVIDER_API CALL
Issa Chiu)  Surgery  [address] [place] [address]  Phone: [phone]  Fax: [phone]  Follow Up Time:

## 2022-11-30 NOTE — DISCHARGE NOTE PROVIDER - NSDCFUADDINST_GEN_ALL_CORE_FT
Follow up with Dr. Chiu in 1-2 weeks. Call the office at the number below to schedule your appointment. You may shower; soap and water over incision sites. Do not scrub. Pat dry when done. No tub bathing or swimming until cleared. Keep incision sites out of the sun as scars will darken. Ambulate as tolerated, but no heavy lifting (>10lbs) or strenuous exercise. You may resume regular diet. You should be urinating at least 3-4x per day. Call the office if you experience increasing abdominal pain, nausea, vomiting, or temperature >101 F.  Please do not continue Cyclosporine after discharge  Follow up with Dr. Chiu in 1-2 weeks. Call the office at the number below to schedule your appointment. You may shower; soap and water over incision sites. Do not scrub. Pat dry when done. No tub bathing or swimming until cleared. Keep incision sites out of the sun as scars will darken. Ambulate as tolerated, but no heavy lifting (>10lbs) or strenuous exercise. You may resume regular diet. You should be urinating at least 3-4x per day. Call the office if you experience increasing abdominal pain, nausea, vomiting, or temperature >101 F.  Do not take CYCLOSPORINE for 6 weeks!

## 2022-12-01 ENCOUNTER — TRANSCRIPTION ENCOUNTER (OUTPATIENT)
Age: 82
End: 2022-12-01

## 2022-12-01 VITALS
DIASTOLIC BLOOD PRESSURE: 65 MMHG | SYSTOLIC BLOOD PRESSURE: 128 MMHG | RESPIRATION RATE: 18 BRPM | OXYGEN SATURATION: 94 % | HEART RATE: 65 BPM | TEMPERATURE: 98 F

## 2022-12-01 LAB
ANION GAP SERPL CALC-SCNC: 6 MMOL/L — SIGNIFICANT CHANGE UP (ref 5–17)
BASOPHILS # BLD AUTO: 0.04 K/UL — SIGNIFICANT CHANGE UP (ref 0–0.2)
BASOPHILS NFR BLD AUTO: 0.7 % — SIGNIFICANT CHANGE UP (ref 0–2)
BUN SERPL-MCNC: 15 MG/DL — SIGNIFICANT CHANGE UP (ref 7–23)
CALCIUM SERPL-MCNC: 9.1 MG/DL — SIGNIFICANT CHANGE UP (ref 8.5–10.1)
CHLORIDE SERPL-SCNC: 109 MMOL/L — HIGH (ref 96–108)
CO2 SERPL-SCNC: 27 MMOL/L — SIGNIFICANT CHANGE UP (ref 22–31)
CREAT SERPL-MCNC: 1.1 MG/DL — SIGNIFICANT CHANGE UP (ref 0.5–1.3)
EGFR: 67 ML/MIN/1.73M2 — SIGNIFICANT CHANGE UP
EOSINOPHIL # BLD AUTO: 0.34 K/UL — SIGNIFICANT CHANGE UP (ref 0–0.5)
EOSINOPHIL NFR BLD AUTO: 5.6 % — SIGNIFICANT CHANGE UP (ref 0–6)
GLUCOSE SERPL-MCNC: 96 MG/DL — SIGNIFICANT CHANGE UP (ref 70–99)
HCT VFR BLD CALC: 38.7 % — LOW (ref 39–50)
HGB BLD-MCNC: 13.3 G/DL — SIGNIFICANT CHANGE UP (ref 13–17)
IMM GRANULOCYTES NFR BLD AUTO: 0.8 % — SIGNIFICANT CHANGE UP (ref 0–0.9)
LYMPHOCYTES # BLD AUTO: 0.65 K/UL — LOW (ref 1–3.3)
LYMPHOCYTES # BLD AUTO: 10.7 % — LOW (ref 13–44)
MAGNESIUM SERPL-MCNC: 1.8 MG/DL — SIGNIFICANT CHANGE UP (ref 1.6–2.6)
MCHC RBC-ENTMCNC: 30.2 PG — SIGNIFICANT CHANGE UP (ref 27–34)
MCHC RBC-ENTMCNC: 34.4 GM/DL — SIGNIFICANT CHANGE UP (ref 32–36)
MCV RBC AUTO: 88 FL — SIGNIFICANT CHANGE UP (ref 80–100)
MONOCYTES # BLD AUTO: 0.62 K/UL — SIGNIFICANT CHANGE UP (ref 0–0.9)
MONOCYTES NFR BLD AUTO: 10.2 % — SIGNIFICANT CHANGE UP (ref 2–14)
NEUTROPHILS # BLD AUTO: 4.36 K/UL — SIGNIFICANT CHANGE UP (ref 1.8–7.4)
NEUTROPHILS NFR BLD AUTO: 72 % — SIGNIFICANT CHANGE UP (ref 43–77)
NRBC # BLD: 0 /100 WBCS — SIGNIFICANT CHANGE UP (ref 0–0)
PHOSPHATE SERPL-MCNC: 2.1 MG/DL — LOW (ref 2.5–4.5)
PLATELET # BLD AUTO: 279 K/UL — SIGNIFICANT CHANGE UP (ref 150–400)
POTASSIUM SERPL-MCNC: 3.9 MMOL/L — SIGNIFICANT CHANGE UP (ref 3.5–5.3)
POTASSIUM SERPL-SCNC: 3.9 MMOL/L — SIGNIFICANT CHANGE UP (ref 3.5–5.3)
RBC # BLD: 4.4 M/UL — SIGNIFICANT CHANGE UP (ref 4.2–5.8)
RBC # FLD: 12.3 % — SIGNIFICANT CHANGE UP (ref 10.3–14.5)
SODIUM SERPL-SCNC: 142 MMOL/L — SIGNIFICANT CHANGE UP (ref 135–145)
WBC # BLD: 6.06 K/UL — SIGNIFICANT CHANGE UP (ref 3.8–10.5)
WBC # FLD AUTO: 6.06 K/UL — SIGNIFICANT CHANGE UP (ref 3.8–10.5)

## 2022-12-01 PROCEDURE — 84100 ASSAY OF PHOSPHORUS: CPT

## 2022-12-01 PROCEDURE — 85730 THROMBOPLASTIN TIME PARTIAL: CPT

## 2022-12-01 PROCEDURE — 84443 ASSAY THYROID STIM HORMONE: CPT

## 2022-12-01 PROCEDURE — 36415 COLL VENOUS BLD VENIPUNCTURE: CPT

## 2022-12-01 PROCEDURE — 85610 PROTHROMBIN TIME: CPT

## 2022-12-01 PROCEDURE — 86901 BLOOD TYPING SEROLOGIC RH(D): CPT

## 2022-12-01 PROCEDURE — C1889: CPT

## 2022-12-01 PROCEDURE — 93005 ELECTROCARDIOGRAM TRACING: CPT

## 2022-12-01 PROCEDURE — 99232 SBSQ HOSP IP/OBS MODERATE 35: CPT

## 2022-12-01 PROCEDURE — 80053 COMPREHEN METABOLIC PANEL: CPT

## 2022-12-01 PROCEDURE — G1004: CPT

## 2022-12-01 PROCEDURE — 85025 COMPLETE CBC W/AUTO DIFF WBC: CPT

## 2022-12-01 PROCEDURE — 83735 ASSAY OF MAGNESIUM: CPT

## 2022-12-01 PROCEDURE — 74177 CT ABD & PELVIS W/CONTRAST: CPT | Mod: ME

## 2022-12-01 PROCEDURE — 97116 GAIT TRAINING THERAPY: CPT

## 2022-12-01 PROCEDURE — 97166 OT EVAL MOD COMPLEX 45 MIN: CPT

## 2022-12-01 PROCEDURE — 96374 THER/PROPH/DIAG INJ IV PUSH: CPT

## 2022-12-01 PROCEDURE — 83605 ASSAY OF LACTIC ACID: CPT

## 2022-12-01 PROCEDURE — 86900 BLOOD TYPING SEROLOGIC ABO: CPT

## 2022-12-01 PROCEDURE — 74018 RADEX ABDOMEN 1 VIEW: CPT

## 2022-12-01 PROCEDURE — 83690 ASSAY OF LIPASE: CPT

## 2022-12-01 PROCEDURE — 86850 RBC ANTIBODY SCREEN: CPT

## 2022-12-01 PROCEDURE — 97162 PT EVAL MOD COMPLEX 30 MIN: CPT

## 2022-12-01 PROCEDURE — 87040 BLOOD CULTURE FOR BACTERIA: CPT

## 2022-12-01 PROCEDURE — 97530 THERAPEUTIC ACTIVITIES: CPT

## 2022-12-01 PROCEDURE — 83036 HEMOGLOBIN GLYCOSYLATED A1C: CPT

## 2022-12-01 PROCEDURE — 96375 TX/PRO/DX INJ NEW DRUG ADDON: CPT

## 2022-12-01 PROCEDURE — 84484 ASSAY OF TROPONIN QUANT: CPT

## 2022-12-01 PROCEDURE — 99285 EMERGENCY DEPT VISIT HI MDM: CPT

## 2022-12-01 PROCEDURE — 87635 SARS-COV-2 COVID-19 AMP PRB: CPT

## 2022-12-01 PROCEDURE — 88307 TISSUE EXAM BY PATHOLOGIST: CPT

## 2022-12-01 PROCEDURE — 80048 BASIC METABOLIC PNL TOTAL CA: CPT

## 2022-12-01 PROCEDURE — 85027 COMPLETE CBC AUTOMATED: CPT

## 2022-12-01 RX ADMIN — Medication 25 MILLIGRAM(S): at 05:20

## 2022-12-01 RX ADMIN — Medication 100 MICROGRAM(S): at 05:19

## 2022-12-01 RX ADMIN — PANTOPRAZOLE SODIUM 40 MILLIGRAM(S): 20 TABLET, DELAYED RELEASE ORAL at 05:20

## 2022-12-01 NOTE — PROGRESS NOTE ADULT - ASSESSMENT
83 y/o male POD#8 s/p small bowel resection on 11/23.    - Continue low fiber diet  - Plan for discharge home today  - Above to be discussed with Dr. Chiu    Surgical Team  Spectralink: [id number] 81 y/o male POD#8 s/p small bowel resection on 11/23.    - Continue low fiber diet  - Plan for discharge home today  - Above to be discussed with Dr. Chiu    Surgical Team  Spectralink: [phone]    Surg. Att.  Pt seen and examined. Discussed with Surg. Team.

## 2022-12-01 NOTE — DISCHARGE NOTE NURSING/CASE MANAGEMENT/SOCIAL WORK - NSDCPEFALRISK_GEN_ALL_CORE
For information on Fall & Injury Prevention, visit: [email or web address] OR  [email or web address] OR  https://www.cdc.gov/steadi/patient.html

## 2022-12-01 NOTE — PROGRESS NOTE ADULT - SUBJECTIVE AND OBJECTIVE BOX
Patient is a 82y old  Male who presents with a chief complaint of small bowel perforation (01 Dec 2022 09:26)      INTERVAL /OVERNIGHT EVENTS: + BM, tolerating reg diet    MEDICATIONS  (STANDING):  donepezil 5 milliGRAM(s) Oral at bedtime  enoxaparin Injectable 40 milliGRAM(s) SubCutaneous every 24 hours  levothyroxine 100 MICROGram(s) Oral daily  metoprolol succinate ER 25 milliGRAM(s) Oral daily  pantoprazole    Tablet 40 milliGRAM(s) Oral before breakfast  senna 2 Tablet(s) Oral at bedtime  tamsulosin 0.4 milliGRAM(s) Oral at bedtime    MEDICATIONS  (PRN):  ondansetron Injectable 4 milliGRAM(s) IV Push every 6 hours PRN Nausea      Allergies    bacitracin (Rash)    Intolerances        REVIEW OF SYSTEMS:  CONSTITUTIONAL: No fever, weight loss, or fatigue  EYES: No eye pain, visual disturbances, or discharge  ENMT:  No difficulty hearing, tinnitus, vertigo; No sinus or throat pain  NECK: No pain or stiffness  RESPIRATORY: No cough, wheezing, chills or hemoptysis; No shortness of breath  CARDIOVASCULAR: No chest pain, palpitations, dizziness, or leg swelling  GASTROINTESTINAL: No abdominal or epigastric pain. No nausea, vomiting, or hematemesis; No diarrhea or constipation. No melena or hematochezia.  GENITOURINARY: No dysuria, frequency, hematuria, or incontinence  NEUROLOGICAL: No headaches, memory loss, loss of strength, numbness, or tremors  SKIN: No itching, burning, rashes, or lesions   LYMPH NODES: No enlarged glands  ENDOCRINE: No heat or cold intolerance; No hair loss; No polydipsia or polyuria  MUSCULOSKELETAL: No joint pain or swelling; No muscle, back, or extremity pain  PSYCHIATRIC: No depression, anxiety, mood swings, or difficulty sleeping  HEME/LYMPH: No easy bruising, or bleeding gums  ALLERGY AND IMMUNOLOGIC: No hives or eczema    Vital Signs Last 24 Hrs  T(C): 36.4 (01 Dec 2022 09:03), Max: 36.6 (30 Nov 2022 19:24)  T(F): 97.6 (01 Dec 2022 09:03), Max: 97.9 (01 Dec 2022 04:44)  HR: 65 (01 Dec 2022 09:03) (59 - 67)  BP: 128/65 (01 Dec 2022 09:03) (125/63 - 137/71)  BP(mean): --  RR: 18 (01 Dec 2022 09:03) (17 - 19)  SpO2: 94% (01 Dec 2022 09:03) (94% - 95%)    Parameters below as of 01 Dec 2022 09:03  Patient On (Oxygen Delivery Method): room air        PHYSICAL EXAM:  GENERAL: NAD, well-groomed, well-developed  HEAD:  Atraumatic, Normocephalic  EYES: EOMI, PERRLA, conjunctiva and sclera clear  ENMT: No tonsillar erythema, exudates, or enlargement; Moist mucous membranes, Good dentition, No lesions  NECK: Supple, No JVD, Normal thyroid  NERVOUS SYSTEM:  Alert & Oriented X3, Good concentration; Motor Strength 5/5 B/L upper and lower extremities; DTRs 2+ intact and symmetric  CHEST/LUNG: Clear to auscultation bilaterally; No rales, rhonchi, wheezing, or rubs  HEART: Regular rate and rhythm; No murmurs, rubs, or gallops  ABDOMEN: Soft, Nontender, Nondistended; Bowel sounds present  EXTREMITIES:  2+ Peripheral Pulses, No clubbing, cyanosis, or edema  LYMPH: No lymphadenopathy noted  SKIN: No rashes or lesions    LABS:                        13.3   6.06  )-----------( 279      ( 01 Dec 2022 07:42 )             38.7     01 Dec 2022 07:42    142    |  109    |  15     ----------------------------<  96     3.9     |  27     |  1.10     Ca    9.1        01 Dec 2022 07:42  Phos  2.1       01 Dec 2022 07:42  Mg     1.8       01 Dec 2022 07:42          CAPILLARY BLOOD GLUCOSE          RADIOLOGY & ADDITIONAL TESTS:    Notes Reviewed:  [x ] YES  [ ] NO    Care Discussed with Consultants/Other Providers [x ] YES  [ ] NO

## 2022-12-01 NOTE — PROGRESS NOTE ADULT - NUTRITIONAL ASSESSMENT
This patient has been assessed with a concern for Malnutrition and has been determined to have a diagnosis/diagnoses of Mild protein-calorie malnutrition.    This patient is being managed with:   Diet Full Liquid-  Supplement Feeding Modality:  Oral  Ensure Plus High Protein Cans or Servings Per Day:  1       Frequency:  Two Times a day  Entered: Nov 29 2022  2:02PM    Diet Full Liquid-  Entered: Nov 29 2022 12:30PM    The following pending diet order is being considered for treatment of Mild protein-calorie malnutrition:null
This patient has been assessed with a concern for Malnutrition and has been determined to have a diagnosis/diagnoses of Mild protein-calorie malnutrition.    This patient is being managed with:   Diet Low Fiber-  Entered: Nov 30 2022  8:20AM    The following pending diet order is being considered for treatment of Mild protein-calorie malnutrition:  Diet Full Liquid-  Supplement Feeding Modality:  Oral  Ensure Plus High Protein Cans or Servings Per Day:  1       Frequency:  Two Times a day  Entered: Nov 29 2022  2:02PM  
This patient has been assessed with a concern for Malnutrition and has been determined to have a diagnosis/diagnoses of Mild protein-calorie malnutrition.    This patient is being managed with:   Diet Low Fiber-  Entered: Nov 30 2022  8:20AM    The following pending diet order is being considered for treatment of Mild protein-calorie malnutrition:  Diet Full Liquid-  Supplement Feeding Modality:  Oral  Ensure Plus High Protein Cans or Servings Per Day:  1       Frequency:  Two Times a day  Entered: Nov 29 2022  2:02PM  
This patient has been assessed with a concern for Malnutrition and has been determined to have a diagnosis/diagnoses of Mild protein-calorie malnutrition.    This patient is being managed with:   Diet NPO-  Except Medications     Special Instructions for Nursing:  Except Medications  Entered: Nov 23 2022 10:06PM    
This patient has been assessed with a concern for Malnutrition and has been determined to have a diagnosis/diagnoses of Mild protein-calorie malnutrition.    This patient is being managed with:   Diet NPO-  Except Medications  Entered: Nov 28 2022  3:50PM    
This patient has been assessed with a concern for Malnutrition and has been determined to have a diagnosis/diagnoses of Mild protein-calorie malnutrition.    This patient is being managed with:   Diet NPO-  Except Medications     Special Instructions for Nursing:  Except Medications  Entered: Nov 23 2022 10:06PM    
This patient has been assessed with a concern for Malnutrition and has been determined to have a diagnosis/diagnoses of Mild protein-calorie malnutrition.    This patient is being managed with:   Diet Clear Liquid-  Entered: Nov 27 2022 11:32AM    
This patient has been assessed with a concern for Malnutrition and has been determined to have a diagnosis/diagnoses of Mild protein-calorie malnutrition.    This patient is being managed with:   Diet Full Liquid-  Supplement Feeding Modality:  Oral  Ensure Plus High Protein Cans or Servings Per Day:  1       Frequency:  Two Times a day  Entered: Nov 29 2022  2:02PM    Diet Full Liquid-  Entered: Nov 29 2022 12:30PM    The following pending diet order is being considered for treatment of Mild protein-calorie malnutrition:null

## 2022-12-01 NOTE — PROGRESS NOTE ADULT - PROBLEM SELECTOR PROBLEM 3
History of BPH

## 2022-12-01 NOTE — PROGRESS NOTE ADULT - SUBJECTIVE AND OBJECTIVE BOX
[facility] -- Anna Brice,  Richa, Familia Brown Savella, Goodger  Office # [phone]    Follow Up:  cardiac optimization    Subjective/Observations: No events overnight resting comfortably in bed.  No complaints of chest pain, dyspnea, or palpitations reported. No signs of orthopnea or PND.       REVIEW OF SYSTEMS: All other review of systems is negative unless indicated above  PAST MEDICAL & SURGICAL HISTORY:  Benign non-nodular prostatic hyperplasia, presence of lower urinary tract symptoms unspecified      Memory impairment      Hypothyroid      Cardiac dysrhythmia      H/O right inguinal hernia repair  1995      S/P appendectomy      S/P right inguinal hernia repair  2016, Dr. Parson        MEDICATIONS  (STANDING):  donepezil 5 milliGRAM(s) Oral at bedtime  enoxaparin Injectable 40 milliGRAM(s) SubCutaneous every 24 hours  levothyroxine 100 MICROGram(s) Oral daily  metoprolol succinate ER 25 milliGRAM(s) Oral daily  pantoprazole    Tablet 40 milliGRAM(s) Oral before breakfast  senna 2 Tablet(s) Oral at bedtime  tamsulosin 0.4 milliGRAM(s) Oral at bedtime    MEDICATIONS  (PRN):  ondansetron Injectable 4 milliGRAM(s) IV Push every 6 hours PRN Nausea    Allergies    bacitracin (Rash)    Intolerances      Vital Signs Last 24 Hrs  T(C): 36.4 (01 Dec 2022 09:03), Max: 36.6 (30 Nov 2022 19:24)  T(F): 97.6 (01 Dec 2022 09:03), Max: 97.9 (01 Dec 2022 04:44)  HR: 65 (01 Dec 2022 09:03) (59 - 67)  BP: 128/65 (01 Dec 2022 09:03) (125/63 - 137/71)  BP(mean): --  RR: 18 (01 Dec 2022 09:03) (17 - 19)  SpO2: 94% (01 Dec 2022 09:03) (94% - 95%)    Parameters below as of 01 Dec 2022 09:03  Patient On (Oxygen Delivery Method): room air      I&O's Summary    30 Nov 2022 07:01  -  01 Dec 2022 07:00  --------------------------------------------------------  IN: 0 mL / OUT: 850 mL / NET: -850 mL        TELE: off  PHYSICAL EXAM:  Constitutional: NAD, awake and alert, well-developed  HEENT: Moist Mucous Membranes, Anicteric  Pulmonary: Non-labored, breath sounds are clear bilaterally, No wheezing, crackles or rhonchi  Cardiovascular: Regular, S1 and S2 nl, No murmurs, rubs, gallops or clicks  Gastrointestinal: Bowel Sounds present, soft, nontender.   Lymph: No lymphadenopathy. No peripheral edema.  Skin: No visible rashes or ulcers.  Psych:  Mood & affect appropriate    LABS: All Labs Reviewed:                        13.3   6.06  )-----------( 279      ( 01 Dec 2022 07:42 )             38.7                         12.7   6.24  )-----------( 244      ( 30 Nov 2022 06:38 )             37.7                         12.7   4.89  )-----------( 226      ( 29 Nov 2022 06:25 )             36.8     01 Dec 2022 07:42    142    |  109    |  15     ----------------------------<  96     3.9     |  27     |  1.10   30 Nov 2022 06:38    144    |  112    |  14     ----------------------------<  96     3.7     |  26     |  0.99   29 Nov 2022 06:25    143    |  113    |  15     ----------------------------<  102    3.7     |  27     |  1.00     Ca    9.1        01 Dec 2022 07:42  Ca    8.8        30 Nov 2022 06:38  Ca    8.5        29 Nov 2022 06:25  Phos  2.1       01 Dec 2022 07:42  Phos  2.5       30 Nov 2022 06:38  Phos  2.5       29 Nov 2022 06:25  Mg     1.8       01 Dec 2022 07:42  Mg     1.7       30 Nov 2022 06:38  Mg     1.7       29 Nov 2022 06:25            12 Lead ECG:   Ventricular Rate 64 BPM    Atrial Rate 64 BPM    P-R Interval 152 ms    QRS Duration 96 ms    Q-T Interval 398 ms    QTC Calculation(Bazett) 410 ms    P Axis 42 degrees    R Axis 37 degrees    T Axis 36 degrees    Diagnosis Line Sinus rhythm with marked sinus arrhythmia  Otherwise normal ECG  When compared with ECG of 23-NOV-2022 11:59, (Unconfirmed)  No significant change was found  Confirmed by kylie Ross ([id number]) on 11/24/2022 11:37:28 AM (11-24-22 @ 08:41)

## 2022-12-01 NOTE — CHART NOTE - NSCHARTNOTEFT_GEN_A_CORE
Do you have Advance Directives (HCP / LV / Organ donation / Documentation of oral advance Directive):   (  x  )  yes    (      )    NO                                                                            Do you have LV - Living will :                                                                                                                                             (  x  )  yes    (      )   No    Do you have HCP - Health Care Proxy:                                                                                                                            (  x   )  yes   (       ) N0    Do you have DNR- Do Not Resuscitate :                                                                                                                           (  x    )  yes  (        )  No    Do you have DNI- Do Not intubate  :                                                                                                                               (  x    )  yes   (       ) No    Do you have MOLST - Medical orders for Life sustaining treatment  :                                                                    (     x ) yes    (       ) No    Decision Maker :  (   x  ) Patient     (      )  HCA   (     ) Public Health Law Surrogate     (      ) Surrogate  (       ) Guardian    Goals of Care :  (      )   Complete Care     (       ) No Limitations                              (       )   Comfort Care       (       )  Hospice                               (    x  )   Limited medical Intervention / s    Medical Interventions :   (        )   CPR       (     x   )  DNR                                               (        )  Intubation with MV - Mechanical Ventilation  (      ) BIPAP/CPAP    (        x )   DNI                                               (         )  Artificial Nutrition -  IVF, TPN / PPN, Tube Feeds             (       x  )   No Feeding Tube                                                (    x    ) Use Antibiotics                         (          ) No Antibiotics                                                (   x      ) Blood and Blood Products     (         )   No Blood or Blood products                                                (   x       )  Dialysis                                    (         )  No Dialysis                                                (          )  Medical Management only  (         )  No Invasive Interventions or Surgery  Time spent :                        (  x     ) upto 30 minutes                       (           )   more than 30 minutes  ACP reviewed and discussed
Assessment: Pt seen for nutrition follow-up. Chart reviewed, hospital course noted.    Brief hx: Pt is a 81 yo M with pmhx of cognitive decline, idiopathic rash, BPH, cardiac arrhythmia, jejunal diverticulum, and hypothyroidism who presents with three days of abdominal pain and distention with small bowel perforation and is s/p small bowel resection on 11/23. NGT removed on 11/25.     Visited pt this afternoon. Pt OOB to chair during visit, family present. Pt previously on clear liquid diet, awaiting return of bowel function. Pt had large BM this am, diet was advanced to full liquids this afternoon. Pt reports fair appetite/intake. One po intake of % per nursing documentation. Tolerating diet well. Denies N/V. +BM 11/29. Pt reports good intake of ensure clear supplement on clear liquid diet. Pt agreeable to receive ensure plus HP supplements on full liquid diet for addtl kcal/protein.     Factors impacting intake: [ ] none [ ] nausea  [ ] vomiting [ ] diarrhea [ ] constipation  [ ]chewing problems [ ] swallowing issues  [X] other: s/p small bowel resection    Diet Prescription: Diet, Full Liquid (11-29-22 @ 12:31)    Intake: fair    Current Weight: Weight (kg): 67.1 (11-23 @ 11:01)  % Weight Change    Pertinent Medications: MEDICATIONS  (STANDING):  donepezil 5 milliGRAM(s) Oral at bedtime  enoxaparin Injectable 40 milliGRAM(s) SubCutaneous every 24 hours  levothyroxine Injectable 50 MICROGram(s) IV Push at bedtime  metoprolol succinate ER 25 milliGRAM(s) Oral daily  pantoprazole    Tablet 40 milliGRAM(s) Oral before breakfast  piperacillin/tazobactam IVPB.. 3.375 Gram(s) IV Intermittent every 8 hours  senna 2 Tablet(s) Oral at bedtime  tamsulosin 0.4 milliGRAM(s) Oral at bedtime    MEDICATIONS  (PRN):  ondansetron Injectable 4 milliGRAM(s) IV Push every 6 hours PRN Nausea    Pertinent Labs: 11-29 Na143 mmol/L Glu 102 mg/dL<H> K+ 3.7 mmol/L Cr  1.00 mg/dL BUN 15 mg/dL 11-29 Phos 2.5 mg/dL 11-28 Alb 2.2 g/dL<L>    Skin: abdominal surgical incision    Estimated Needs:   [X] no change since previous assessment: based on #/66.6kg  25-30kcal/kg (1665-1998kcal)  1-1.2g pro/kg (67-80gm protein)  [ ] recalculated:     Previous Nutrition Diagnosis:   [ ] Inadequate Energy Intake [X] Inadequate Oral Intake [ ] Excessive Energy Intake   [ ] Underweight [ ] Increased Nutrient Needs [ ] Overweight/Obesity   [ ] Altered GI Function [ ] Unintended Weight Loss [ ] Food & Nutrition Related Knowledge Deficit [X] Malnutrition (mild/acute)    Nutrition Diagnosis is [X] ongoing  [ ] resolved [ ] not applicable     New Nutrition Diagnosis: [X] not applicable     Interventions:   Recommend  [ ] Change Diet To:  [X] Nutrition Supplement: ensure plus HP BID  [ ] Nutrition Support  [X] Other: Continue full liquid diet at this time, progress to low-fiber as medically appropriate  Recommend MVI daily    Monitoring and Evaluation:   [X] PO intake [ x ] Tolerance to diet prescription [ x ] weights [ x ] labs[ x ] follow up per protocol  [X] other: s/s GI distress, bowel function, skin integrity/ edema

## 2022-12-01 NOTE — PROGRESS NOTE ADULT - SUBJECTIVE AND OBJECTIVE BOX
Pt seen and examined at bedside, denies any overnight events. Vital signs stable. Tolerating diet, w/ GI function. Eager to go home.  Denies abdominal pain, nausea, vomiting, headache, chest pain, shortness of breath.    T(C): 36.6 (12-01-22 @ 04:44), Max: 36.6 (11-30-22 @ 19:24)  HR: 63 (12-01-22 @ 04:44) (59 - 67)  BP: 127/72 (12-01-22 @ 04:44) (125/63 - 137/71)  RR: 19 (12-01-22 @ 04:44) (17 - 19)  SpO2: 95% (12-01-22 @ 04:44) (95% - 95%)    PHYSICAL EXAM:  General: no acute distress, appears comfortable  HEENT: normocephalic, anicteric  Pulm: non labored respirations  Cardio: RRR  Abdomen: soft, nontender, nondistended  Extremities: no calf tenderness noted    I&O's Detail    30 Nov 2022 07:01  -  01 Dec 2022 07:00  --------------------------------------------------------  IN:  Total IN: 0 mL    OUT:    Voided (mL): 850 mL  Total OUT: 850 mL    Total NET: -850 mL    LABS:                        12.7   6.24  )-----------( 244      ( 30 Nov 2022 06:38 )             37.7     11-30    144  |  112<H>  |  14  ----------------------------<  96  3.7   |  26  |  0.99    Ca    8.8      30 Nov 2022 06:38  Phos  2.5     11-30  Mg     1.7     11-30    MEDICATIONS:  donepezil 5 milliGRAM(s) Oral at bedtime  enoxaparin Injectable 40 milliGRAM(s) SubCutaneous every 24 hours  levothyroxine 100 MICROGram(s) Oral daily  metoprolol succinate ER 25 milliGRAM(s) Oral daily  ondansetron Injectable 4 milliGRAM(s) IV Push every 6 hours PRN  pantoprazole    Tablet 40 milliGRAM(s) Oral before breakfast  senna 2 Tablet(s) Oral at bedtime  tamsulosin 0.4 milliGRAM(s) Oral at bedtime

## 2022-12-01 NOTE — PROGRESS NOTE ADULT - PROBLEM SELECTOR PLAN 3
continue flomax

## 2022-12-01 NOTE — PROGRESS NOTE ADULT - PROBLEM SELECTOR PROBLEM 2
Cardiac arrhythmia

## 2022-12-01 NOTE — PROGRESS NOTE ADULT - ASSESSMENT
81 y/o M with BPH, hypothyroidism with recent ILR for possible arrhythmia presented from an Urgent [facility] for abdominal distention, diffuse bloating, and pain.  He was found to have perforated bowel and is s/p lap to open small bowel resection    Cardiac Optimization/Cardiac Arrhythmia  - p/w abd pain and distention, + bowel perforation, s/p Small bowel resection, tolerated well from CV POV    - No evidence of significant volume overload   - s/p ILR implantation (2 wks ago) for palpitations.    - EKG showed sinus arrhythmia with no ischemia.    - BP and HR controlled, continue Toprol     - Dc planning per surgical team  - Monitor and replete lytes, keep K>4, Mg>2.  - Will continue to follow.    Pasquale Choudhary NP  Nurse Practitioner- Cardiology   Spectra #[phone]

## 2022-12-01 NOTE — PROGRESS NOTE ADULT - PROBLEM SELECTOR PLAN 2
cardio eval and follow up  stable  continue beta blocker - toprol  off norvasc now
cardio eval and follow up  stable
cardio eval and follow up  stable  continue beta blocker - toprol  off norvasc
cardio eval and follow up  stable  continue beta blocker - toprol  off norvasc now
cardio eval and follow up  stable  continue beta blocker
cardio eval and follow up  stable  continue beta blocker - toprol
cardio eval and follow up
cardio eval and follow up  stable  continue beta blocker - toprol  off norvasc now

## 2022-12-01 NOTE — PROGRESS NOTE ADULT - PROBLEM SELECTOR PLAN 4
continue synthroid, po resumed
continue synthroid
continue synthroid, po resumed

## 2022-12-01 NOTE — PROGRESS NOTE ADULT - PROVIDER SPECIALTY LIST ADULT
Cardiology
Cardiology
Surgery
Cardiology
Cardiology
Surgery
Cardiology
Family Medicine
Surgery
Surgery
Hospitalist
Surgery
Family Medicine

## 2022-12-01 NOTE — PROGRESS NOTE ADULT - NS ATTEND AMEND GEN_ALL_CORE FT
I have personally seen and examined the patient in detail.  I have spoken to the provider regarding the assessment and plan of care.  I have made changes to the note accordingly.
Tolerated OR.  Continue current management.  To follow.
doing well from cv standpoint  noted to have sr and sa, possible sa exit block  will cont to monitor closely
-there is no evidence of acute ischemia.  -there is no evidence of significant arrhythmia.  -there is no evidence for meaningful  volume overload.  -no cardiovascular complications postop
No evidence of active ischemia or vol Ol.  to follow while admitted.
Tolerated OR from a cardiac point of view.  Continue current medications.
Tolerated OR.  Continue current management.  To follow.

## 2022-12-01 NOTE — PROGRESS NOTE ADULT - PROBLEM SELECTOR PROBLEM 1
Small bowel perforation

## 2022-12-01 NOTE — DISCHARGE NOTE NURSING/CASE MANAGEMENT/SOCIAL WORK - PATIENT PORTAL LINK FT
You can access the FollowMyHealth Patient Portal offered by [facility] by registering at the following website: [email or web address]. By joining [facility]’s FollowMyHealth portal, you will also be able to view your health information using other applications (apps) compatible with our system.

## 2022-12-01 NOTE — PROGRESS NOTE ADULT - REASON FOR ADMISSION
small bowel perforation

## 2022-12-02 PROBLEM — I49.9 CARDIAC ARRHYTHMIA, UNSPECIFIED: Chronic | Status: ACTIVE | Noted: 2022-11-23

## 2022-12-06 ENCOUNTER — APPOINTMENT (OUTPATIENT)
Dept: SURGERY | Facility: CLINIC | Age: 82
End: 2022-12-06

## 2022-12-06 VITALS
SYSTOLIC BLOOD PRESSURE: 117 MMHG | HEART RATE: 70 BPM | OXYGEN SATURATION: 97 % | WEIGHT: 138 LBS | BODY MASS INDEX: 19.76 KG/M2 | DIASTOLIC BLOOD PRESSURE: 70 MMHG | HEIGHT: 70 IN

## 2022-12-06 PROCEDURE — 99024 POSTOP FOLLOW-UP VISIT: CPT

## 2023-01-03 ENCOUNTER — APPOINTMENT (OUTPATIENT)
Dept: SURGERY | Facility: CLINIC | Age: 83
End: 2023-01-03
Payer: MEDICARE

## 2023-01-03 VITALS
DIASTOLIC BLOOD PRESSURE: 67 MMHG | HEART RATE: 71 BPM | WEIGHT: 140 LBS | BODY MASS INDEX: 20.04 KG/M2 | OXYGEN SATURATION: 99 % | TEMPERATURE: 98 F | SYSTOLIC BLOOD PRESSURE: 140 MMHG | HEIGHT: 70 IN

## 2023-01-03 PROCEDURE — 99024 POSTOP FOLLOW-UP VISIT: CPT

## 2023-05-13 ENCOUNTER — NON-APPOINTMENT (OUTPATIENT)
Age: 83
End: 2023-05-13

## 2023-06-26 ENCOUNTER — APPOINTMENT (OUTPATIENT)
Dept: NEUROLOGY | Facility: CLINIC | Age: 83
End: 2023-06-26
Payer: MEDICARE

## 2023-06-26 PROCEDURE — 99205 OFFICE O/P NEW HI 60 MIN: CPT

## 2023-06-26 RX ORDER — CYCLOSPORINE 25 MG/1
25 CAPSULE, GELATIN COATED ORAL
Refills: 0 | Status: ACTIVE | COMMUNITY
Start: 2023-06-26

## 2023-06-26 RX ORDER — LEVOTHYROXINE SODIUM 0.1 MG/1
100 TABLET ORAL
Refills: 0 | Status: ACTIVE | COMMUNITY
Start: 2023-06-26

## 2023-06-26 RX ORDER — METOPROLOL TARTRATE 25 MG/1
25 TABLET, FILM COATED ORAL
Refills: 0 | Status: ACTIVE | COMMUNITY
Start: 2023-06-26

## 2023-06-27 NOTE — ASSESSMENT
[FreeTextEntry1] : Assessment: 84 YO RH man w/ PMH of  RLE DVT on eliquis,  hypothyroidism, rash on cyclosporine? taking for more than 20 years,  diverticulitis presents with progressive amnestic decline. He is independent with ADLs and IADLs. He lives with his wife and is not very active on a regular basis. MMSE 29/30. Mild dysexecutive function. Motor exam w/ mild RUE rigidity, but overall ok. \par \par Diagnostic Impression:\par -forgetfulness\par -learning new information \par \par Plan:\par [] obtain records from [facility]; upload MRI from [facility]?\par [] metabolic labs \par [] recreational centers \par \par I recommended to pursue mental and physical activity and to adhere to Mediterranean type of diet.\par \par A thorough discussion was entertained with the patient/caregiver regarding the use of psychoactive medications, their possible benefits and AE profile, including the risk of cardiovascular complications, including but not limited to applicable black box warning and teratogenicity, where appropriate.\par We discussed the benefits of being active, physically and mentally, and the need to to establish a routine in this respect.\par Driving abilities and firearms possession and use were discussed, in relation to progression of the cognitive decline, and the need to assess them periodically.\par Patient/caregiver advised to bring previous records to this office.\par All questions were answered at the time of the visit. We are certainly available for further discussion as needed.\par Patient/caregiver fully understands and agree with the plan.\par \par \par \par \par \par \par \par \par get records\par MRI from [facility] take a look

## 2023-06-27 NOTE — HISTORY OF PRESENT ILLNESS
[FreeTextEntry1] : COVID in 2020 w/ mild symptoms\par COVID VACCINE FULL.\par \par HPI: 82 YO RH man presents with memory issues that first started about three years ago. He will read and rapidly forget. As per his wife, he will ask about familiar names that he should know. He will ask the same questions and repeat himself.  He will have difficulty learning new information. He has a new car and will be told multiple times on how to use the technology but can't seem to grasp. As per wife there has been a mild progressive decline. There has not been any life changing events. \par \par Of note, two years ago was following initially with neurologist  Dr. Bina Foster and was told that he had moderate cognitive decline. MR brain possibly done at [facility]. Neuropsych testing was peformed. 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 taking for more than 20 years,  diverticulitis\par \par PFH: his mother had late onset dementia \par \par -Memory: remote memory ok; recent memory impaired \par -Speech:  no difficulty with articulation, finding words, using the wrong meaning to words. Comprehension intact. Fluent\par -Orientation: some episodes of getting lost driving  familiar places. Agitation driving tp unfamiliar places. No confusion\par -Praxis: no issues\par -Decision making/Executive fx/Multitasking:  no issues\par \par -Sleep: On average sleeping 6 -7 hours.  No REM sleep behavior.  No prior sleep study. No daytime sleepiness or naps. \par \par -Appetite:  no weight loss or changes in appetite\par \par -Motor symptoms:  no tremors, shuffling of the gait, tremors or falls\par \par -B/B: no issues \par \par -Psychiatric symptoms:  feels ok most of the time\par \par -Functional status:\par Mcallister Index of [place] in Activities of Daily Living:\par 1. Bathing/Showerin\par 2. Dressin\par 3. Toiletin\par 4. Transferrin\par 5. Continence: 1\par 6: Feedin\par \par TOTAL: 6\par \par Lorna-Jonas Instrumental Activities of Daily Living:\par A. Ability to Use Telephone: 1\par B. Shoppin\par C. Food Preparation: n/a\par D. Housekeeping: n/a\par E. Laundry: n/a\par F. Transportation: 1 ( difficulty with technology in the car) \par G. Responsibility for Own Medications: 1\par H: Ability to Handle Finances: 1\par \par CDR: 0.5\par \par -Professional status:  He worked as a   and a life . No former use of tobacco or drugs. Highest level of education is a masters in English. He is  and lives with his wife. He occasionally still works.  He reads the news paper. He walks sometimes. \par \par PCP and other physicians:\par -PCP: SOCO MARQUES\par -Neuro:Bina Foster MD\par \par Workup done:\par MR brain ?\par Neuropsychologist: Leonid Figueredo, PhD\par \par

## 2023-06-27 NOTE — PHYSICAL EXAM
[Person] : oriented to person [Time] : oriented to time [Date / Time ___ / 5] : date / time [unfilled] / 5 [Place ___ / 5] : place [unfilled] / 5 [Registration ___ / 3] : registration [unfilled] / 3 [Serial Sevens ___/5] : serial sevens [unfilled] / 5 [Naming 2 Objects ___ / 2] : naming two objects [unfilled] / 2 [Repeating a Sentence ___ / 1] : repeating a sentence [unfilled] / 1 [Writing a Sentence ___ / 1] : write sentence [unfilled] / 1 [3-stage Verbal Command ___ / 3] : three-stage verbal command [unfilled] / 3 [Written Command ___ / 1] : written command [unfilled] / 1 [Copy a Design ___ / 1] : copy a design [unfilled] / 1 [Recall ___ / 3] : recall [unfilled] / 3 [General Appearance - Alert] : alert [General Appearance - In No Acute Distress] : in no acute distress [Oriented To Time, Place, And Person] : oriented to person, place, and time [Place] : oriented to place [Short Term Intact] : short term memory intact [Remote Intact] : remote memory intact [Registration Intact] : recent registration memory intact [Span Intact] : the attention span was normal [Concentration Intact] : normal concentrating ability [Visual Intact] : visual attention was ~T not ~L decreased [Naming Objects] : no difficulty naming common objects [Repeating Phrases] : no difficulty repeating a phrase [Writing A Sentence] : no difficulty writing a sentence [Fluency] : fluency intact [Reading] : reading intact [Comprehension] : comprehension intact [Total Score ___ / 30] : the patient achieved a score of [unfilled] /30 [Cranial Nerves Optic (II)] : visual acuity intact bilaterally,  visual fields full to confrontation, pupils equal round and reactive to light [Cranial Nerves Oculomotor (III)] : extraocular motion intact [Cranial Nerves Trigeminal (V)] : facial sensation intact symmetrically [Cranial Nerves Facial (VII)] : face symmetrical [Cranial Nerves Vestibulocochlear (VIII)] : hearing was intact bilaterally [Cranial Nerves Glossopharyngeal (IX)] : tongue and palate midline [Cranial Nerves Accessory (XI - Cranial And Spinal)] : head turning and shoulder shrug symmetric [Cranial Nerves Hypoglossal (XII)] : there was no tongue deviation with protrusion [Motor Handedness Right-Handed] : the patient is right hand dominant [Sensation Tactile Decrease] : light touch was intact [Sclera] : the sclera and conjunctiva were normal [PERRL With Normal Accommodation] : pupils were equal in size, round, reactive to light, with normal accommodation [Extraocular Movements] : extraocular movements were intact [No APD] : no afferent pupillary defect [No CHERELLE] : no internuclear ophthalmoplegia [Full Visual Field] : full visual field [FreeTextEntry1] : Mental Status Exam\par Presidents: 3/5 \par Alternating Pattern: ok \par Spiral:ok \par Clock: 3/3 \par Repetition:  ok\par \par Trail A: ok B: unable to finish but ok \par Fluency: A:  > 11 words Animals: ok \par \par Go-No-Go: ok \par \par R/L discrimination on self and examiner:ok\par Cross-line commands: ok\par Praxis:ok\par -Motor:ok\par -Dynamic/Luria: difficulty performing the series \par -Ideomotor/Imitation:  ok\par -Ideational/writing/closing-in:ok\par \par Proverbs:\par -It's no use crying over spilled milk: ok\par -A chain is only as strong as its weakest link: issue\par -You can’t teach an old dog new tricks: ok\par -Laughter is the best medicine: ok\par \par FW and RW digit span:\par -4-9-3: ok \par -3-8-1-4: ok\par -6-2-9-7-1: two mistakes \par -7-1-8-4-6-2: ok\par \par \par \par \par \par  [Motor Strength Upper Extremities Bilaterally] : strength was normal in both upper extremities [Motor Strength Lower Extremities Bilaterally] : strength was normal in both lower extremities [Romberg's Sign] : Romberg's sign was negtive [Limited Balance] : balance was intact [Past-pointing] : there was no past-pointing [Tremor] : no tremor present [Dysdiadochokinesia Bilaterally] : not present [Glabellar Reflex] : the glabellar reflex was absent [FreeTextEntry6] : moderate left arm rigidity  [FreeTextEntry8] : normal stride and mild  reduced right arm swing. Turn ok. Pull test ok. Can march in place independently.

## 2023-07-19 ENCOUNTER — TRANSCRIPTION ENCOUNTER (OUTPATIENT)
Age: 83
End: 2023-07-19

## 2023-12-19 ENCOUNTER — APPOINTMENT (OUTPATIENT)
Dept: NEUROLOGY | Facility: CLINIC | Age: 83
End: 2023-12-19
Payer: MEDICARE

## 2023-12-19 DIAGNOSIS — R41.3 OTHER AMNESIA: ICD-10-CM

## 2023-12-19 DIAGNOSIS — R45.1 RESTLESSNESS AND AGITATION: ICD-10-CM

## 2023-12-19 DIAGNOSIS — R79.89 OTHER SPECIFIED ABNORMAL FINDINGS OF BLOOD CHEMISTRY: ICD-10-CM

## 2023-12-19 PROCEDURE — 99214 OFFICE O/P EST MOD 30 MIN: CPT

## 2023-12-19 RX ORDER — DONEPEZIL HYDROCHLORIDE 10 MG/1
10 TABLET ORAL
Qty: 30 | Refills: 1 | Status: ACTIVE | COMMUNITY
Start: 2023-06-26

## 2023-12-19 RX ORDER — APIXABAN 2.5 MG/1
2.5 TABLET, FILM COATED ORAL
Refills: 0 | Status: ACTIVE | COMMUNITY
Start: 2023-06-26

## 2023-12-19 NOTE — HISTORY OF PRESENT ILLNESS
[FreeTextEntry1] : Interval Hx: 2023  Since last seen, he has a low tolerance with learning or doing anything out of routine.  ADLs and IADLs remain intact. No issues with appetite or sleep. No falls.  He is mostly sedentary during the day. Plays chest, reads a bit but not as much as he used to. No physical activity.  HPI Interval: 2023  COVID in  w/ mild symptoms COVID VACCINE FULL.  HPI: 82 YO RH man presents with memory issues that first started about three years ago. He will read and rapidly forget. As per his wife, he will ask about familiar names that he should know. He will ask the same questions and repeat himself.  He will have difficulty learning new information. He has a new car and will be told multiple times on how to use the technology but can't seem to grasp. As per wife there has been a mild progressive decline. There has not been any life changing events.   Of note, two years ago was following initially with neurologist  Dr. Bina Foster and was told that he had moderate cognitive decline. MR brain possibly done at [facility]. Neuropsych testing was peformed.   PMH: RLE DVT on eliquis,  hypothyroidism, rash on cyclosporine? taking for more than 20 years,  diverticulitis  PFH: his mother had late onset dementia   -Memory: remote memory ok; recent memory impaired  -Speech:  no difficulty with articulation, finding words, using the wrong meaning to words. Comprehension intact. Fluent -Orientation: some episodes of getting lost driving  familiar places. Agitation driving tp unfamiliar places. No confusion -Praxis: no issues -Decision making/Executive fx/Multitasking:  no issues  -Sleep: On average sleeping 6 -7 hours.  No REM sleep behavior.  No prior sleep study. No daytime sleepiness or naps.   -Appetite:  no weight loss or changes in appetite  -Motor symptoms:  no tremors, shuffling of the gait, tremors or falls  -B/B: no issues   -Psychiatric symptoms:  feels ok most of the time  -Functional status: Mcallister Index of [place] in Activities of Daily Livin. Bathing/Showerin 2. Dressin 3. Toiletin 4. Transferrin 5. Continence: 1 6: Feedin  TOTAL: 6  Lorna-Jonas Instrumental Activities of Daily Living: A. Ability to Use Telephone: 1 B. Shoppin C. Food Preparation: n/a D. Housekeeping: n/a E. Laundry: n/a F. Transportation: 1 ( difficulty with technology in the car)  G. Responsibility for Own Medications: 1 H: Ability to Handle Finances: 1  CDR: 0.5  -Professional status:  He worked as a   and a life . No former use of tobacco or drugs. Highest level of education is a masters in English. He is  and lives with his wife. He occasionally still works.  He reads the news paper. He walks sometimes.   PCP and other physicians: -PCP: SOCO MARQUES -Neuro:Bina Foster MD  Workup done: MR brain ? Neuropsychologist: Leonid Figueredo, PhD

## 2023-12-19 NOTE — PHYSICAL EXAM
[General Appearance - Alert] : alert [General Appearance - In No Acute Distress] : in no acute distress [Oriented To Time, Place, And Person] : oriented to person, place, and time [Person] : oriented to person [Place] : oriented to place [Time] : oriented to time [Short Term Intact] : short term memory intact [Remote Intact] : remote memory intact [Registration Intact] : recent registration memory intact [Span Intact] : the attention span was normal [Concentration Intact] : normal concentrating ability [Visual Intact] : visual attention was ~T not ~L decreased [Naming Objects] : no difficulty naming common objects [Repeating Phrases] : no difficulty repeating a phrase [Writing A Sentence] : no difficulty writing a sentence [Fluency] : fluency intact [Comprehension] : comprehension intact [Reading] : reading intact [Cranial Nerves Optic (II)] : visual acuity intact bilaterally,  visual fields full to confrontation, pupils equal round and reactive to light [Cranial Nerves Oculomotor (III)] : extraocular motion intact [Cranial Nerves Trigeminal (V)] : facial sensation intact symmetrically [Cranial Nerves Facial (VII)] : face symmetrical [Cranial Nerves Vestibulocochlear (VIII)] : hearing was intact bilaterally [Cranial Nerves Glossopharyngeal (IX)] : tongue and palate midline [Cranial Nerves Accessory (XI - Cranial And Spinal)] : head turning and shoulder shrug symmetric [Cranial Nerves Hypoglossal (XII)] : there was no tongue deviation with protrusion [Motor Handedness Right-Handed] : the patient is right hand dominant [Sensation Tactile Decrease] : light touch was intact [Sclera] : the sclera and conjunctiva were normal [PERRL With Normal Accommodation] : pupils were equal in size, round, reactive to light, with normal accommodation [Extraocular Movements] : extraocular movements were intact [No APD] : no afferent pupillary defect [No CHERELLE] : no internuclear ophthalmoplegia [Full Visual Field] : full visual field [FreeTextEntry1] : Mental Status Exam Presidents: 3/5  Alternating Pattern: ok  Spiral:ok  Clock: 3/3  Repetition:  ok  Trail A: ok B: unable to finish but ok  Fluency: A:  > 11 words Animals: ok   Go-No-Go: ok   R/L discrimination on self and examiner:ok Cross-line commands: ok Praxis:ok -Motor:ok -Dynamic/Luria: difficulty performing the series in both hands   -Ideomotor/Imitation:  ok -Ideational/writing/closing-in:ok [Total Score ___ / 30] : the patient achieved a score of [unfilled] /30 [Date / Time ___ / 5] : date / time [unfilled] / 5 [Place ___ / 5] : place [unfilled] / 5 [Registration ___ / 3] : registration [unfilled] / 3 [Serial Sevens ___/5] : serial sevens [unfilled] / 5 [Naming 2 Objects ___ / 2] : naming two objects [unfilled] / 2 [Repeating a Sentence ___ / 1] : repeating a sentence [unfilled] / 1 [Writing a Sentence ___ / 1] : write sentence [unfilled] / 1 [3-stage Verbal Command ___ / 3] : three-stage verbal command [unfilled] / 3 [Written Command ___ / 1] : written command [unfilled] / 1 [Copy a Design ___ / 1] : copy a design [unfilled] / 1 [Recall ___ / 3] : recall [unfilled] / 3 [Motor Strength Upper Extremities Bilaterally] : strength was normal in both upper extremities [Motor Strength Lower Extremities Bilaterally] : strength was normal in both lower extremities [Romberg's Sign] : Romberg's sign was negtive [Limited Balance] : balance was intact [Past-pointing] : there was no past-pointing [Tremor] : no tremor present [Dysdiadochokinesia Bilaterally] : not present [Glabellar Reflex] : the glabellar reflex was absent [FreeTextEntry6] : moderate left arm rigidity, essential and action tremor in both arms  [FreeTextEntry8] : normal stride and mild reduced right arm swing. Turn ok. Pull test ok. Can march in place independently.

## 2023-12-19 NOTE — ASSESSMENT
[FreeTextEntry1] : Assessment: 82 YO RH man w/ PMH of RLE DVT on eliquis, hypothyroidism, rash on cyclosporine? taking for more than 20 years, diverticulitis presents for a follow up visit for short term memory loss. Frustration and low tolerence for doing things out of routine. No issues with sleep or apppetite. No falls.  He remains independent with ADLs and IADLs. He is not very physically or mentally engaged during the day. MMSE 30/30. Cognitive exam remains unchanged. Motor exam w/ mild RUE rigidity, but overall ok. 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 TSH elevated.   Diagnostic Impression: suggestive of LOAD -forgetfulness -learning new information  Plan: [] EKG [] start Celexa 10 mg daily [] continue donepezil at standing dose for now  [] repeat TSH w/ reflex [] endocrinology referral [] lifestyle modifications including diet, exercise, brain stimulating exercises etc.  [] Cognitive rehab   Communication via telephone in place. Will call the office if there is any new neurological complaints and will seek emergent evaluation for concerning red flag symptoms discussed..

## 2024-02-08 NOTE — ED ADULT TRIAGE NOTE - PAIN: PRESENCE, MLM
Physical Therapy    Visit Type: treatment  SUBJECTIVE  Patient agreed to participate in therapy this date.  Pt reports doing \"alright\" today. Minimal conversation from pt this session.   Patient / Family Goal: maximize function and return to previous functional status    Pain   Patient reports pain is not an issue/concern.     OBJECTIVE     Cognitive Status   Affect/Behavior    - cooperative, pleasant and flat  Following Direction   - follows all commands and directions consistently    Vitals:  No adverse response to activity noted during session. On 3L of O2.          Bed Mobility  - Supine to sit: with verbal cues, contact guard/touching/steadying assist  Cues to use Upper extremity to pull from rail to sitting  Transfers  Assistive devices: 4-wheeled walker, gait belt  - Sit to stand: with verbal cues, supervision  - Stand to sit: with verbal cues, supervision  Assist with multiple lines    Ambulation / Gait  - Assistive device: gait belt and four-wheeled walker  - Distance (feet unless otherwise indicated): 75  - Assist Level: supervision  - Description: decreased harlan/pace and forward flexed at hips  Assist with multiple lines/tubes, distance limited by pt fatigue         Interventions     Training provided: activity tolerance, bed mobility training, use of assistive device and functional ambulation    Skilled input: Verbal instruction/cues  Verbal Consent: Writer verbally educated and received verbal consent for hand placement, positioning of patient, and techniques to be performed today from patient for clothing adjustments for techniques as described above and how they are pertinent to the patient's plan of care.         Education:   - Present and ready to learn: patient  Education provided during session:  - Results of above outlined education: Verbalizes understanding    ASSESSMENT   Progress: progressing toward goals  Interferring components: decreased activity tolerance and medical status  limitations    Discharge needs based on today's assessment:   - Current level of function: slightly below baseline level of function   - Therapy needs at discharge: therapy 1-3 times per week   - Activities of daily living (ADLs) requiring support at discharge: bed mobility, transfers, ambulation and stairs   - Instrumental activities of daily living (IADLs) requiring support at discharge: home management and community mobility   - Impairments that require further therapy intervention: activity tolerance, balance and strength    AM-PAC  - Generalized Prior Level of Function: IND/MOD I ([facility] 22-24)       Key: MOD A=moderate assistance, IND/MOD I=independent/modified independent  - Generalized Current Level of Function     - Current Mobility Score: 18       AM-PAC Scoring Key= >21 Modified Independent; 20-21 Supervision; 18-19 Minimal assist; 13-17 Moderate assist; 9-12 Max assist; <9 Total assist      PLAN (while hospitalized)  Suggestions for next session as indicated: Bring aide for line management, bed mobility, progress ambulation distance, attempt steps (3 4\" steps into home)  PT Frequency: 3-5 x per week      PT/OT Mobility Equipment for Discharge: none, owns 4ww  PT/OT ADL Equipment for Discharge: owns shower chair and toilet riser as well as sock aid and reacher  Agreement to plan and goals: patient agrees with goals and treatment plan        GOALS  Review Date: 2/20/2024  Long Term Goals: (to be met by time of discharge from hospital)  Sit to supine: Patient will complete sit to supine modified independent.  Status: progressing/ongoing  Supine to sit: Patient will complete supine to sit modified independent.  Status: progressing/ongoing  Sit to stand: Patient will complete sit to stand transfer with 4-wheeled walker, modified independent.   Status: progressing/ongoing  Stand to sit: Patient will complete stand to sit transfer with 4-wheeled walker, modified independent.   Status:  progressing/ongoing  Ambulation (even): Patient will ambulate on even surface for 150 feet with 4-wheeled walker, modified independent.   Status: progressing/ongoing  3-4 steps: Patient will ambulate 3-4 steps with using one rail, modified independent.   Status: progressing/ongoing  Documented in the chart in the following areas: Prior Level of Function. Assessment/Plan.      Patient at End of Session:   Location: in chair  Safety measures: call light within reach, alarm system in place/re-engaged and lines intact  Handoff to: nurse      I was in the immediate presence of the student and directed the student’s performance of the services. I am responsible for all treatment, assessment, documentation, and billing rendered for this patient.   Matt Hernandes, PT        Therapy procedure time and total treatment time can be found documented on the Time Entry flowsheet   complains of pain/discomfort

## 2024-04-19 RX ORDER — CITALOPRAM HYDROBROMIDE 20 MG/1
20 TABLET, FILM COATED ORAL
Qty: 90 | Refills: 3 | Status: ACTIVE | COMMUNITY
Start: 2023-12-19 | End: 1900-01-01

## 2025-01-13 ENCOUNTER — APPOINTMENT (OUTPATIENT)
Dept: NEUROLOGY | Facility: CLINIC | Age: 85
End: 2025-01-13

## 2025-07-18 ENCOUNTER — APPOINTMENT (OUTPATIENT)
Dept: ORTHOPEDIC SURGERY | Facility: CLINIC | Age: 85
End: 2025-07-18

## 2025-07-18 VITALS — WEIGHT: 150 LBS | BODY MASS INDEX: 21.47 KG/M2 | HEIGHT: 70 IN

## 2025-07-18 PROBLEM — M54.41 ACUTE RIGHT-SIDED LOW BACK PAIN WITH RIGHT-SIDED SCIATICA: Status: ACTIVE | Noted: 2025-07-18

## 2025-07-18 PROBLEM — Z78.9 NO PERTINENT PAST MEDICAL HISTORY: Status: RESOLVED | Noted: 2025-07-18 | Resolved: 2025-07-18

## 2025-07-18 PROCEDURE — 99204 OFFICE O/P NEW MOD 45 MIN: CPT

## 2025-07-18 PROCEDURE — 72100 X-RAY EXAM L-S SPINE 2/3 VWS: CPT

## 2025-07-18 PROCEDURE — 72170 X-RAY EXAM OF PELVIS: CPT

## 2025-07-18 RX ORDER — METHYLPREDNISOLONE 4 MG/1
4 TABLET ORAL
Qty: 1 | Refills: 1 | Status: ACTIVE | COMMUNITY
Start: 2025-07-18 | End: 1900-01-01

## 2025-07-24 ENCOUNTER — APPOINTMENT (OUTPATIENT)
Dept: MRI IMAGING | Facility: CLINIC | Age: 85
End: 2025-07-24
Payer: MEDICARE

## 2025-07-24 PROCEDURE — 72148 MRI LUMBAR SPINE W/O DYE: CPT

## 2025-07-25 RX ORDER — TRAMADOL HYDROCHLORIDE AND ACETAMINOPHEN 37.5; 325 MG/1; MG/1
37.5-325 TABLET, FILM COATED ORAL
Qty: 45 | Refills: 0 | Status: ACTIVE | COMMUNITY
Start: 2025-07-25 | End: 1900-01-01

## 2025-07-29 ENCOUNTER — APPOINTMENT (OUTPATIENT)
Dept: ORTHOPEDIC SURGERY | Facility: CLINIC | Age: 85
End: 2025-07-29
Payer: MEDICARE

## 2025-07-29 VITALS — BODY MASS INDEX: 20.9 KG/M2 | HEIGHT: 70 IN | WEIGHT: 146 LBS

## 2025-07-29 DIAGNOSIS — M43.17 SPONDYLOLISTHESIS, LUMBOSACRAL REGION: ICD-10-CM

## 2025-07-29 DIAGNOSIS — M51.362: ICD-10-CM

## 2025-07-29 DIAGNOSIS — M47.816 SPONDYLOSIS W/OUT MYELOPATHY OR RADICULOPATHY, LUMBAR REGION: ICD-10-CM

## 2025-07-29 DIAGNOSIS — Z86.39 PERSONAL HISTORY OF OTHER ENDOCRINE, NUTRITIONAL AND METABOLIC DISEASE: ICD-10-CM

## 2025-07-29 DIAGNOSIS — I51.9 HEART DISEASE, UNSPECIFIED: ICD-10-CM

## 2025-07-29 PROCEDURE — 99213 OFFICE O/P EST LOW 20 MIN: CPT

## 2025-07-31 ENCOUNTER — APPOINTMENT (OUTPATIENT)
Dept: PAIN MANAGEMENT | Facility: CLINIC | Age: 85
End: 2025-07-31
Payer: MEDICARE

## 2025-07-31 VITALS — HEIGHT: 70 IN | BODY MASS INDEX: 20.9 KG/M2 | WEIGHT: 146 LBS

## 2025-07-31 PROCEDURE — 99204 OFFICE O/P NEW MOD 45 MIN: CPT

## 2025-08-07 ENCOUNTER — APPOINTMENT (OUTPATIENT)
Dept: PAIN MANAGEMENT | Facility: CLINIC | Age: 85
End: 2025-08-07
Payer: MEDICARE

## 2025-08-07 PROCEDURE — 64483 NJX AA&/STRD TFRM EPI L/S 1: CPT | Mod: RT

## 2025-08-07 PROCEDURE — 64484 NJX AA&/STRD TFRM EPI L/S EA: CPT | Mod: RT,59

## 2025-08-07 PROCEDURE — J3490M: CUSTOM | Mod: NC

## 2025-08-18 ENCOUNTER — APPOINTMENT (OUTPATIENT)
Dept: PAIN MANAGEMENT | Facility: CLINIC | Age: 85
End: 2025-08-18
Payer: MEDICARE

## 2025-08-18 VITALS — HEIGHT: 70 IN | BODY MASS INDEX: 21.19 KG/M2 | WEIGHT: 148 LBS

## 2025-08-18 DIAGNOSIS — M54.50 LOW BACK PAIN, UNSPECIFIED: ICD-10-CM

## 2025-08-18 DIAGNOSIS — M54.17 RADICULOPATHY, LUMBOSACRAL REGION: ICD-10-CM

## 2025-08-18 PROCEDURE — 99214 OFFICE O/P EST MOD 30 MIN: CPT

## (undated) DEVICE — FOLEY TRAY 16FR 5CC LF UMETER CLOSED

## (undated) DEVICE — PLV-SCD MACHINE: Type: DURABLE MEDICAL EQUIPMENT

## (undated) DEVICE — LAP PAD W RING 18 X 18"

## (undated) DEVICE — DRAPE FLUID WARMER 44 X 66"

## (undated) DEVICE — Device

## (undated) DEVICE — SYR ASEPTO

## (undated) DEVICE — SUT POLYSORB 3-0 30" V-20 UNDYED

## (undated) DEVICE — DRAPE 3/4 SHEET W REINFORCEMENT 56X77"

## (undated) DEVICE — SUT SOFSILK 3-0 30" V-20

## (undated) DEVICE — DRAPE LAVH 124" X 30" X125"

## (undated) DEVICE — PLV/PSP-ESU 01A1472B: Type: DURABLE MEDICAL EQUIPMENT

## (undated) DEVICE — SHEARS HARMONIC 1100 5MM X 36CM CURVED TIP

## (undated) DEVICE — GOWN SMARTGOWN RAGLAN XLG

## (undated) DEVICE — SUT SOFSILK 3-0 30" TIES

## (undated) DEVICE — VENODYNE/SCD SLEEVE CALF MEDIUM

## (undated) DEVICE — VENODYNE/SCD SLEEVE CALF LARGE

## (undated) DEVICE — SOL IRR BAG H2O 1000ML

## (undated) DEVICE — GLV 7 PROTEXIS (WHITE)

## (undated) DEVICE — SOL IRR POUR NS 0.9% 1000ML

## (undated) DEVICE — STAPLER COVIDIEN ENDO GIA STANDARD HANDLE

## (undated) DEVICE — PREP BETADINE KIT

## (undated) DEVICE — SUT MAXON 2-0 30" GS-22

## (undated) DEVICE — WARMING BLANKET UPPER ADULT

## (undated) DEVICE — FOLEY TRAY 16FR LF URINE METER SURESTEP

## (undated) DEVICE — DRAPE TOWEL BLUE 17" X 24"

## (undated) DEVICE — PACK MINOR WITH LAP

## (undated) DEVICE — PACK MAJOR ABDOMINAL WITH LAP

## (undated) DEVICE — LIGASURE IMPACT

## (undated) DEVICE — DRSG DERMABOND 0.7ML